# Patient Record
Sex: MALE | Race: WHITE | NOT HISPANIC OR LATINO | Employment: FULL TIME | URBAN - METROPOLITAN AREA
[De-identification: names, ages, dates, MRNs, and addresses within clinical notes are randomized per-mention and may not be internally consistent; named-entity substitution may affect disease eponyms.]

---

## 2017-02-28 ENCOUNTER — ALLSCRIPTS OFFICE VISIT (OUTPATIENT)
Dept: OTHER | Facility: OTHER | Age: 42
End: 2017-02-28

## 2017-02-28 DIAGNOSIS — R51.9 HEADACHE: ICD-10-CM

## 2017-02-28 DIAGNOSIS — Z00.00 ENCOUNTER FOR GENERAL ADULT MEDICAL EXAMINATION WITHOUT ABNORMAL FINDINGS: ICD-10-CM

## 2017-04-06 ENCOUNTER — APPOINTMENT (OUTPATIENT)
Dept: LAB | Facility: CLINIC | Age: 42
End: 2017-04-06
Payer: COMMERCIAL

## 2017-04-06 DIAGNOSIS — R51.9 HEADACHE: ICD-10-CM

## 2017-04-06 DIAGNOSIS — Z00.00 ENCOUNTER FOR GENERAL ADULT MEDICAL EXAMINATION WITHOUT ABNORMAL FINDINGS: ICD-10-CM

## 2017-04-06 LAB
ALBUMIN SERPL BCP-MCNC: 3.8 G/DL (ref 3.5–5)
ALP SERPL-CCNC: 97 U/L (ref 46–116)
ALT SERPL W P-5'-P-CCNC: 23 U/L (ref 12–78)
ANION GAP SERPL CALCULATED.3IONS-SCNC: 5 MMOL/L (ref 4–13)
AST SERPL W P-5'-P-CCNC: 10 U/L (ref 5–45)
BILIRUB SERPL-MCNC: 0.4 MG/DL (ref 0.2–1)
BUN SERPL-MCNC: 10 MG/DL (ref 5–25)
CALCIUM SERPL-MCNC: 9.2 MG/DL (ref 8.3–10.1)
CHLORIDE SERPL-SCNC: 105 MMOL/L (ref 100–108)
CHOLEST SERPL-MCNC: 248 MG/DL (ref 50–200)
CO2 SERPL-SCNC: 28 MMOL/L (ref 21–32)
CREAT SERPL-MCNC: 0.99 MG/DL (ref 0.6–1.3)
ERYTHROCYTE [DISTWIDTH] IN BLOOD BY AUTOMATED COUNT: 13.5 % (ref 11.6–15.1)
GFR SERPL CREATININE-BSD FRML MDRD: >60 ML/MIN/1.73SQ M
GLUCOSE P FAST SERPL-MCNC: 88 MG/DL (ref 65–99)
HCT VFR BLD AUTO: 44.5 % (ref 36.5–49.3)
HDLC SERPL-MCNC: 49 MG/DL (ref 40–60)
HGB BLD-MCNC: 15.1 G/DL (ref 12–17)
LDLC SERPL CALC-MCNC: 178 MG/DL (ref 0–100)
MCH RBC QN AUTO: 32.3 PG (ref 26.8–34.3)
MCHC RBC AUTO-ENTMCNC: 33.9 G/DL (ref 31.4–37.4)
MCV RBC AUTO: 95 FL (ref 82–98)
PLATELET # BLD AUTO: 331 THOUSANDS/UL (ref 149–390)
PMV BLD AUTO: 10.3 FL (ref 8.9–12.7)
POTASSIUM SERPL-SCNC: 4.4 MMOL/L (ref 3.5–5.3)
PROT SERPL-MCNC: 7.2 G/DL (ref 6.4–8.2)
RBC # BLD AUTO: 4.68 MILLION/UL (ref 3.88–5.62)
SODIUM SERPL-SCNC: 138 MMOL/L (ref 136–145)
TRIGL SERPL-MCNC: 107 MG/DL
WBC # BLD AUTO: 12.68 THOUSAND/UL (ref 4.31–10.16)

## 2017-04-06 PROCEDURE — 80061 LIPID PANEL: CPT

## 2017-04-06 PROCEDURE — 80053 COMPREHEN METABOLIC PANEL: CPT

## 2017-04-06 PROCEDURE — 36415 COLL VENOUS BLD VENIPUNCTURE: CPT

## 2017-04-06 PROCEDURE — 85027 COMPLETE CBC AUTOMATED: CPT

## 2017-05-04 ENCOUNTER — GENERIC CONVERSION - ENCOUNTER (OUTPATIENT)
Dept: OTHER | Facility: OTHER | Age: 42
End: 2017-05-04

## 2018-01-13 VITALS
SYSTOLIC BLOOD PRESSURE: 120 MMHG | RESPIRATION RATE: 18 BRPM | TEMPERATURE: 97.8 F | WEIGHT: 213 LBS | BODY MASS INDEX: 28.23 KG/M2 | DIASTOLIC BLOOD PRESSURE: 78 MMHG | HEART RATE: 88 BPM | HEIGHT: 73 IN

## 2018-05-12 DIAGNOSIS — E83.42 HYPOMAGNESEMIA: Primary | ICD-10-CM

## 2018-05-13 RX ORDER — MAGNESIUM OXIDE 400 MG/1
TABLET ORAL
Qty: 60 TABLET | Refills: 2 | Status: SHIPPED | OUTPATIENT
Start: 2018-05-13 | End: 2019-09-02 | Stop reason: HOSPADM

## 2018-08-23 DIAGNOSIS — E83.42 HYPOMAGNESEMIA: ICD-10-CM

## 2018-08-23 RX ORDER — MAGNESIUM OXIDE 400 MG/1
TABLET ORAL
Qty: 60 TABLET | Refills: 2 | OUTPATIENT
Start: 2018-08-23

## 2018-09-10 NOTE — TELEPHONE ENCOUNTER
Pt should have fu visit his pharmacy is requesting refill of magnesium and he has not been here isnce 2/2017

## 2019-09-01 ENCOUNTER — HOSPITAL ENCOUNTER (OUTPATIENT)
Facility: HOSPITAL | Age: 44
Setting detail: OBSERVATION
Discharge: HOME | End: 2019-09-02
Attending: EMERGENCY MEDICINE | Admitting: SURGERY
Payer: OTHER MISCELLANEOUS

## 2019-09-01 ENCOUNTER — APPOINTMENT (EMERGENCY)
Dept: RADIOLOGY | Facility: HOSPITAL | Age: 44
End: 2019-09-01
Payer: OTHER MISCELLANEOUS

## 2019-09-01 ENCOUNTER — APPOINTMENT (OUTPATIENT)
Dept: RADIOLOGY | Age: 44
End: 2019-09-01
Payer: OTHER MISCELLANEOUS

## 2019-09-01 ENCOUNTER — APPOINTMENT (OUTPATIENT)
Dept: URGENT CARE | Age: 44
End: 2019-09-01
Payer: OTHER MISCELLANEOUS

## 2019-09-01 DIAGNOSIS — S12.500A C6 CERVICAL FRACTURE (HCC): Primary | ICD-10-CM

## 2019-09-01 DIAGNOSIS — S12.500A: ICD-10-CM

## 2019-09-01 DIAGNOSIS — M54.2 NECK PAIN: Primary | ICD-10-CM

## 2019-09-01 DIAGNOSIS — M54.50 ACUTE BILATERAL LOW BACK PAIN WITHOUT SCIATICA: ICD-10-CM

## 2019-09-01 DIAGNOSIS — M54.2 NECK PAIN: ICD-10-CM

## 2019-09-01 LAB
ALBUMIN SERPL BCP-MCNC: 3.4 G/DL (ref 3.5–5)
ALP SERPL-CCNC: 90 U/L (ref 46–116)
ALT SERPL W P-5'-P-CCNC: 23 U/L (ref 12–78)
ANION GAP SERPL CALCULATED.3IONS-SCNC: 5 MMOL/L (ref 4–13)
AST SERPL W P-5'-P-CCNC: 21 U/L (ref 5–45)
BASOPHILS # BLD AUTO: 0.05 THOUSANDS/ΜL (ref 0–0.1)
BASOPHILS NFR BLD AUTO: 1 % (ref 0–1)
BILIRUB SERPL-MCNC: 0.18 MG/DL (ref 0.2–1)
BUN SERPL-MCNC: 11 MG/DL (ref 5–25)
CALCIUM SERPL-MCNC: 8.5 MG/DL (ref 8.3–10.1)
CHLORIDE SERPL-SCNC: 105 MMOL/L (ref 100–108)
CO2 SERPL-SCNC: 27 MMOL/L (ref 21–32)
CREAT SERPL-MCNC: 0.86 MG/DL (ref 0.6–1.3)
EOSINOPHIL # BLD AUTO: 0.18 THOUSAND/ΜL (ref 0–0.61)
EOSINOPHIL NFR BLD AUTO: 2 % (ref 0–6)
ERYTHROCYTE [DISTWIDTH] IN BLOOD BY AUTOMATED COUNT: 13.5 % (ref 11.6–15.1)
GFR SERPL CREATININE-BSD FRML MDRD: 107 ML/MIN/1.73SQ M
GLUCOSE SERPL-MCNC: 123 MG/DL (ref 65–140)
HCT VFR BLD AUTO: 40.6 % (ref 36.5–49.3)
HGB BLD-MCNC: 13.5 G/DL (ref 12–17)
IMM GRANULOCYTES # BLD AUTO: 0.02 THOUSAND/UL (ref 0–0.2)
IMM GRANULOCYTES NFR BLD AUTO: 0 % (ref 0–2)
LYMPHOCYTES # BLD AUTO: 3.96 THOUSANDS/ΜL (ref 0.6–4.47)
LYMPHOCYTES NFR BLD AUTO: 44 % (ref 14–44)
MCH RBC QN AUTO: 31.9 PG (ref 26.8–34.3)
MCHC RBC AUTO-ENTMCNC: 33.3 G/DL (ref 31.4–37.4)
MCV RBC AUTO: 96 FL (ref 82–98)
MONOCYTES # BLD AUTO: 0.63 THOUSAND/ΜL (ref 0.17–1.22)
MONOCYTES NFR BLD AUTO: 7 % (ref 4–12)
NEUTROPHILS # BLD AUTO: 4.18 THOUSANDS/ΜL (ref 1.85–7.62)
NEUTS SEG NFR BLD AUTO: 46 % (ref 43–75)
NRBC BLD AUTO-RTO: 0 /100 WBCS
PLATELET # BLD AUTO: 280 THOUSANDS/UL (ref 149–390)
PMV BLD AUTO: 9.7 FL (ref 8.9–12.7)
POTASSIUM SERPL-SCNC: 3.9 MMOL/L (ref 3.5–5.3)
PROT SERPL-MCNC: 6.7 G/DL (ref 6.4–8.2)
RBC # BLD AUTO: 4.23 MILLION/UL (ref 3.88–5.62)
SODIUM SERPL-SCNC: 137 MMOL/L (ref 136–145)
WBC # BLD AUTO: 9.02 THOUSAND/UL (ref 4.31–10.16)

## 2019-09-01 PROCEDURE — 72131 CT LUMBAR SPINE W/O DYE: CPT

## 2019-09-01 PROCEDURE — 99283 EMERGENCY DEPT VISIT LOW MDM: CPT | Performed by: PHYSICIAN ASSISTANT

## 2019-09-01 PROCEDURE — G0382 LEV 3 HOSP TYPE B ED VISIT: HCPCS | Performed by: PHYSICIAN ASSISTANT

## 2019-09-01 PROCEDURE — 72100 X-RAY EXAM L-S SPINE 2/3 VWS: CPT

## 2019-09-01 PROCEDURE — 85025 COMPLETE CBC W/AUTO DIFF WBC: CPT | Performed by: EMERGENCY MEDICINE

## 2019-09-01 PROCEDURE — 36415 COLL VENOUS BLD VENIPUNCTURE: CPT | Performed by: EMERGENCY MEDICINE

## 2019-09-01 PROCEDURE — 80053 COMPREHEN METABOLIC PANEL: CPT | Performed by: EMERGENCY MEDICINE

## 2019-09-01 PROCEDURE — 99285 EMERGENCY DEPT VISIT HI MDM: CPT

## 2019-09-01 PROCEDURE — 99285 EMERGENCY DEPT VISIT HI MDM: CPT | Performed by: EMERGENCY MEDICINE

## 2019-09-01 PROCEDURE — 72040 X-RAY EXAM NECK SPINE 2-3 VW: CPT

## 2019-09-01 PROCEDURE — 72125 CT NECK SPINE W/O DYE: CPT

## 2019-09-01 RX ORDER — ACETAMINOPHEN 325 MG/1
975 TABLET ORAL ONCE
Status: COMPLETED | OUTPATIENT
Start: 2019-09-01 | End: 2019-09-01

## 2019-09-01 RX ADMIN — ACETAMINOPHEN 975 MG: 325 TABLET ORAL at 21:57

## 2019-09-02 ENCOUNTER — APPOINTMENT (OUTPATIENT)
Dept: RADIOLOGY | Facility: HOSPITAL | Age: 44
End: 2019-09-02
Payer: OTHER MISCELLANEOUS

## 2019-09-02 VITALS
TEMPERATURE: 97.3 F | DIASTOLIC BLOOD PRESSURE: 73 MMHG | RESPIRATION RATE: 19 BRPM | BODY MASS INDEX: 28.44 KG/M2 | OXYGEN SATURATION: 96 % | SYSTOLIC BLOOD PRESSURE: 112 MMHG | WEIGHT: 210 LBS | HEART RATE: 84 BPM | HEIGHT: 72 IN

## 2019-09-02 PROBLEM — S12.500A TRAUMATIC CLOSED FRACTURE OF C6 VERTEBRA WITH MINIMAL DISPLACEMENT (HCC): Status: ACTIVE | Noted: 2019-09-02

## 2019-09-02 PROCEDURE — 99219 PR INITIAL OBSERVATION CARE/DAY 50 MINUTES: CPT | Performed by: EMERGENCY MEDICINE

## 2019-09-02 PROCEDURE — 99244 OFF/OP CNSLTJ NEW/EST MOD 40: CPT | Performed by: NEUROLOGICAL SURGERY

## 2019-09-02 PROCEDURE — NC001 PR NO CHARGE: Performed by: SURGERY

## 2019-09-02 PROCEDURE — 70496 CT ANGIOGRAPHY HEAD: CPT

## 2019-09-02 PROCEDURE — 72040 X-RAY EXAM NECK SPINE 2-3 VW: CPT

## 2019-09-02 PROCEDURE — 70498 CT ANGIOGRAPHY NECK: CPT

## 2019-09-02 RX ORDER — SODIUM CHLORIDE 9 MG/ML
75 INJECTION, SOLUTION INTRAVENOUS CONTINUOUS
Status: DISCONTINUED | OUTPATIENT
Start: 2019-09-02 | End: 2019-09-02

## 2019-09-02 RX ORDER — ACETAMINOPHEN 325 MG/1
975 TABLET ORAL EVERY 8 HOURS SCHEDULED
Qty: 30 TABLET | Refills: 0 | Status: SHIPPED | OUTPATIENT
Start: 2019-09-02

## 2019-09-02 RX ORDER — OXYCODONE HYDROCHLORIDE 10 MG/1
10 TABLET ORAL EVERY 4 HOURS PRN
Status: DISCONTINUED | OUTPATIENT
Start: 2019-09-02 | End: 2019-09-02 | Stop reason: HOSPADM

## 2019-09-02 RX ORDER — GABAPENTIN 100 MG/1
100 CAPSULE ORAL 3 TIMES DAILY
Status: DISCONTINUED | OUTPATIENT
Start: 2019-09-02 | End: 2019-09-02 | Stop reason: HOSPADM

## 2019-09-02 RX ORDER — OXYCODONE HYDROCHLORIDE 5 MG/1
5 TABLET ORAL EVERY 4 HOURS PRN
Qty: 20 TABLET | Refills: 0 | Status: SHIPPED | OUTPATIENT
Start: 2019-09-02 | End: 2019-09-12

## 2019-09-02 RX ORDER — METHOCARBAMOL 750 MG/1
750 TABLET, FILM COATED ORAL EVERY 6 HOURS SCHEDULED
Status: DISCONTINUED | OUTPATIENT
Start: 2019-09-02 | End: 2019-09-02 | Stop reason: HOSPADM

## 2019-09-02 RX ORDER — GABAPENTIN 100 MG/1
100 CAPSULE ORAL 3 TIMES DAILY
Qty: 90 CAPSULE | Refills: 0 | Status: SHIPPED | OUTPATIENT
Start: 2019-09-02 | End: 2019-09-30 | Stop reason: SDUPTHER

## 2019-09-02 RX ORDER — OXYCODONE HYDROCHLORIDE 5 MG/1
5 TABLET ORAL EVERY 4 HOURS PRN
Status: DISCONTINUED | OUTPATIENT
Start: 2019-09-02 | End: 2019-09-02 | Stop reason: HOSPADM

## 2019-09-02 RX ORDER — ACETAMINOPHEN 325 MG/1
975 TABLET ORAL EVERY 8 HOURS SCHEDULED
Status: DISCONTINUED | OUTPATIENT
Start: 2019-09-02 | End: 2019-09-02 | Stop reason: HOSPADM

## 2019-09-02 RX ORDER — ONDANSETRON 2 MG/ML
4 INJECTION INTRAMUSCULAR; INTRAVENOUS EVERY 4 HOURS PRN
Status: DISCONTINUED | OUTPATIENT
Start: 2019-09-02 | End: 2019-09-02 | Stop reason: HOSPADM

## 2019-09-02 RX ORDER — HYDROMORPHONE HCL/PF 1 MG/ML
0.5 SYRINGE (ML) INJECTION
Status: DISCONTINUED | OUTPATIENT
Start: 2019-09-02 | End: 2019-09-02 | Stop reason: HOSPADM

## 2019-09-02 RX ORDER — METHOCARBAMOL 750 MG/1
750 TABLET, FILM COATED ORAL EVERY 6 HOURS SCHEDULED
Qty: 60 TABLET | Refills: 0 | Status: SHIPPED | OUTPATIENT
Start: 2019-09-02 | End: 2019-10-14

## 2019-09-02 RX ADMIN — GABAPENTIN 100 MG: 100 CAPSULE ORAL at 12:06

## 2019-09-02 RX ADMIN — ACETAMINOPHEN 975 MG: 325 TABLET ORAL at 13:23

## 2019-09-02 RX ADMIN — SODIUM CHLORIDE 75 ML/HR: 0.9 INJECTION, SOLUTION INTRAVENOUS at 05:20

## 2019-09-02 RX ADMIN — METHOCARBAMOL 750 MG: 750 TABLET, FILM COATED ORAL at 12:06

## 2019-09-02 RX ADMIN — ACETAMINOPHEN 975 MG: 325 TABLET ORAL at 06:41

## 2019-09-02 RX ADMIN — IOHEXOL 85 ML: 350 INJECTION, SOLUTION INTRAVENOUS at 00:28

## 2019-09-02 NOTE — DISCHARGE INSTRUCTIONS
Discharge Instructions - Neurosurgery    · VISTA collar at all times except for showering change to arsenio collar  · No heavy lifting  No strenuous activities  NO DRIVING  **Please notify MD immediately if you have increased neck or arm pain  New numbness and/or weakness in your arm  Difficulty swallowing or breathing especially while lying down  Numbness or weakness in arms or legs  **    Please follow up in 2 weeks with the Neurosurgical group with repeat X-ray of cervical spine 2-3 days prior to your appointment  You may go to any [facility]'s facility to get your imaging done  Please call [phone] to schedule your imaging appointment

## 2019-09-02 NOTE — H&P
H&P Exam - Trauma   Yan Anderson 45 y o  male MRN: [id number]  Unit/Bed#: [facility] 613-01 Encounter: [id number]    Assessment/Plan   Trauma Alert: Evaluation  Model of Arrival: Self  Trauma Team: Attending Kody Marin and Residents Reza  Consultants: NSX    Trauma Active Problems:   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 and inferior facet with minimal comminution    Trauma Plan:   CTA neck  Neurosurgery consult  [place] collar  Cervical precautions  Analgesia  Neuro checks    Chief Complaint: My neck hurts    History of Present Illness   HPI:  Yan Anderson is a 45 y o  male who presents as a trauma consultation for a C6 fracture  The patient dropped a Pal[place] of foam pads off a laron while working 5 days ago  He initially lost his balance and fell onto his low back  The palate hit him on top of his head  He experience a crunching sensation and severe pain in his neck  He went to an urgent care and had plain films that were concerning for facet fracture  He was sent here for CT scan  He complains of neck pain  Denies headache  No focal neuro deficits  Mechanism:Fall    Review of Systems   Constitutional: Negative for appetite change, chills, diaphoresis, fatigue and fever  HENT: Negative for congestion, rhinorrhea and sore throat  Respiratory: Negative for apnea, cough, choking, chest tightness, shortness of breath, wheezing and stridor  Cardiovascular: Negative for chest pain, palpitations and leg swelling  Gastrointestinal: Negative for abdominal distention, abdominal pain, constipation, diarrhea, nausea and vomiting  Genitourinary: Negative for dysuria and hematuria  Musculoskeletal: Positive for neck pain  Negative for back pain and neck stiffness  Skin: Negative for pallor, rash and wound  Neurological: Negative for dizziness, light-headedness and headaches  Psychiatric/Behavioral: Negative for behavioral problems and confusion         Historical Information       History reviewed  No pertinent past medical history  Past Surgical History:   Procedure Laterality Date    CHOLECYSTECTOMY      GALLBLADDER SURGERY N/A 2010     Social History   Social History     Substance and Sexual Activity   Alcohol Use Not Currently     Social History     Substance and Sexual Activity   Drug Use Not Currently     Social History     Tobacco Use   Smoking Status Never Smoker   Smokeless Tobacco Never Used       There is no immunization history on file for this patient  Last Tetanus: unknown  Family History: Non-contributory        Meds/Allergies   all current active meds have been reviewed    No Known Allergies      PHYSICAL EXAM    PE limited by: n/a    Objective   Vitals:   First set: Temperature: 98 °F (36 7 °C) (09/01/19 1957)  Pulse: 91 (09/01/19 1957)  Respirations: 18 (09/01/19 1957)  Blood Pressure: 149/83 (09/01/19 1957)    Primary Survey:   (A) Airway: intact  (B) Breathing: ctab  (C) Circulation: Pulses:   carotid  2/4, pedal  2/4, radial  2/4 and femoral  2/4  (D) Disabliity:  GCS Total:  15  (E) Expose:  Completed    Secondary Survey: (Click on Physical Exam tab above)  Physical Exam   Constitutional: He is oriented to person, place, and time  He appears well-developed and well-nourished  No distress  HENT:   Head: Normocephalic and atraumatic  Eyes: Pupils are equal, round, and reactive to light  Conjunctivae and EOM are normal  No scleral icterus  Neck: Normal range of motion  Neck supple  Cardiovascular: Normal rate, regular rhythm, normal heart sounds and intact distal pulses  No murmur heard  Pulmonary/Chest: Effort normal and breath sounds normal  No respiratory distress  He has no wheezes  Abdominal: Soft  Bowel sounds are normal  He exhibits no distension  There is no tenderness  Musculoskeletal: Normal range of motion  He exhibits tenderness (Lower cervical midline tenderness to palpation  No deformity, step-off )  He exhibits no edema     Neurological: He is alert and oriented to person, place, and time  He displays normal reflexes  No cranial nerve deficit or sensory deficit  He exhibits normal muscle tone  Coordination normal  GCS eye subscore is 4  GCS verbal subscore is 5  GCS motor subscore is 6  Skin: Skin is warm and dry  No rash noted  He is not diaphoretic  Psychiatric: He has a normal mood and affect  His behavior is normal    Nursing note and vitals reviewed  Invasive Devices     Peripheral Intravenous Line            Peripheral IV 09/01/19 Left Antecubital less than 1 day                Lab Results:   Results: I have personally reviewed pertinent films in PACS, BMP/CMP:   Lab Results   Component Value Date    SODIUM 137 09/01/2019    K 3 9 09/01/2019     09/01/2019    CO2 27 09/01/2019    BUN 11 09/01/2019    CREATININE 0 86 09/01/2019    CALCIUM 8 5 09/01/2019    AST 21 09/01/2019    ALT 23 09/01/2019    ALKPHOS 90 09/01/2019    EGFR 107 09/01/2019    and CBC:   Lab Results   Component Value Date    WBC 9 02 09/01/2019    HGB 13 5 09/01/2019    HCT 40 6 09/01/2019    MCV 96 09/01/2019     09/01/2019    MCH 31 9 09/01/2019    MCHC 33 3 09/01/2019    RDW 13 5 09/01/2019    MPV 9 7 09/01/2019    NRBC 0 09/01/2019     Imaging/EKG Studies: Results: I have personally reviewed pertinent films in PACS   Cta Head And Neck With And Without Contrast    Result Date: 9/2/2019  Impression: 1  No acute intracranial hemorrhage, midline shift, or mass effect  2   No acute vascular injury identified within the arteries of the neck  3   No high-grade proximal stenosis of the visualized [place]  4   No significant intracranial arteriovenous malformation or aneurysm  5   Redemonstrated displaced fracture of the C6 facet on the left with stable grade 1 anterolisthesis of C6 on C7  Further evaluation with MRI may be obtained if there is concern for ligamentous injury   Workstation performed: [id number]     Xr Spine Cervical 2 Or 3 Vw Injury    Result Date: 9/1/2019  Impression: Radiographic findings which are quite suspicious for traumatic unilateral jumped facet at C6-C7 level  Further characterization with cervical spine CT is necessary  I personally discussed this study with Fox Pineda on 9/1/2019 at 7:17 PM  Workstation performed: [id number]     Xr Spine Lumbar 2 Or 3 Views Injury    Result Date: 9/1/2019  Impression: No acute osseous abnormality  Degenerative changes as described  Workstation performed: [id number]     Trauma - Ct Spine Cervical Wo Contrast    Result Date: 9/1/2019  Impression: 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 and inferior facet with minimal comminution  There is associated rotatory subluxation with anterolisthesis of C6 relative to C7 as a result of the left posterior elements fracture at C6  The fracture line approaches the left C6 transverse foramen and therefore CT angiography is recommended to exclude associated vascular injury  I personally discussed this study with Edwige Bautista on 9/1/2019 at 10:04 PM   Workstation performed: [id number]     Ct Spine Lumbar Wo Contrast    Result Date: 9/1/2019  Impression: No acute fracture or traumatic malalignment in the lumbar spine  Mild lumbar degenerative changes   Workstation performed: [id number]     Other Studies:     Code Status: Level 1 - Full Code  Advance Directive and Living Will:      Power of :    POLST:

## 2019-09-02 NOTE — PLAN OF CARE
Problem: PAIN - ADULT  Goal: Verbalizes/displays adequate comfort level or baseline comfort level  Description  Interventions:  - Encourage patient to monitor pain and request assistance  - Assess pain using appropriate pain scale  - Administer analgesics based on type and severity of pain and evaluate response  - Implement non-pharmacological measures as appropriate and evaluate response  - Consider cultural and social influences on pain and pain management  - Notify physician/advanced practitioner if interventions unsuccessful or patient reports new pain  Outcome: Progressing     Problem: INFECTION - ADULT  Goal: Absence or prevention of progression during hospitalization  Description  INTERVENTIONS:  - Assess and monitor for signs and symptoms of infection  - Monitor lab/diagnostic results  - Monitor all insertion sites, i e  indwelling lines, tubes, and drains  - Monitor endotracheal if appropriate and nasal secretions for changes in amount and color  - [place] appropriate cooling/warming therapies per order  - Administer medications as ordered  - Instruct and encourage patient and family to use good hand hygiene technique  - Identify and instruct in appropriate isolation precautions for identified infection/condition  Outcome: Progressing     Problem: SAFETY ADULT  Goal: Patient will remain free of falls  Description  INTERVENTIONS:  - Assess patient frequently for physical needs  -  Identify cognitive and physical deficits and behaviors that affect risk of falls    -  [place] fall precautions as indicated by assessment   - Educate patient/family on patient safety including physical limitations  - Instruct patient to call for assistance with activity based on assessment  - Modify environment to reduce risk of injury  - Consider OT/PT consult to assist with strengthening/mobility  Outcome: Progressing  Goal: Maintain or return to baseline ADL function  Description  INTERVENTIONS:  -  Assess patient's ability to carry out ADLs; assess patient's baseline for ADL function and identify physical deficits which impact ability to perform ADLs (bathing, care of mouth/teeth, toileting, grooming, dressing, etc )  - Assess/evaluate cause of self-care deficits   - Assess range of motion  - Assess patient's mobility; develop plan if impaired  - Assess patient's need for assistive devices and provide as appropriate  - Encourage maximum independence but intervene and supervise when necessary  - Involve family in performance of ADLs  - Assess for home care needs following discharge   - Consider OT consult to assist with ADL evaluation and planning for discharge  - Provide patient education as appropriate  Outcome: Progressing  Goal: Maintain or return mobility status to optimal level  Description  INTERVENTIONS:  - Assess patient's baseline mobility status (ambulation, transfers, stairs, etc )    - Identify cognitive and physical deficits and behaviors that affect mobility  - Identify mobility aids required to assist with transfers and/or ambulation (gait belt, sit-to-stand, lift, walker, cane, etc )  - [place] fall precautions as indicated by assessment  - Record patient progress and toleration of activity level on Mobility SBAR; progress patient to next Phase/Stage  - Instruct patient to call for assistance with activity based on assessment  - Consider rehabilitation consult to assist with strengthening/weightbearing, etc   Outcome: Progressing     Problem: DISCHARGE PLANNING  Goal: Discharge to home or other facility with appropriate resources  Description  INTERVENTIONS:  - Identify barriers to discharge w/patient and caregiver  - Arrange for needed discharge resources and transportation as appropriate  - Identify discharge learning needs (meds, wound care, etc )  - Arrange for interpretive services to assist at discharge as needed  - Refer to Case Management Department for coordinating discharge planning if the patient needs post-hospital services based on physician/advanced practitioner order or complex needs related to functional status, cognitive ability, or social support system  Outcome: Progressing

## 2019-09-02 NOTE — CONSULTS
Addendum:    X-ray cervical spine completed today appears grossly stable with adequate alignment when compared to previous x-ray done yesterday  At this time will trial conservative management  [employer] collar at all times, Lacy collar for showering  Patient will return to the office in 2 weeks with repeat upright cervical spine x-rays for further evaluation of C6 left facet fracture  He is instructed to call the office sooner should he experience worsening symptoms or red flag signs  Informed patient that at the 2 week follow-up will evaluated fracture is getting better or if surgical intervention may be warranted at that time  Patient and wife demonstrate understanding and are in agreement with the plan  Patient is okay to have a diet and ambulate with physical therapy and occupational therapy  He is cleared from a neurosurgical standpoint to discharge  Will see as needed during hospitalization  Signed off  Consult- Mallorie Hood 1975, 45 y o  male MRN: [id number]    Unit/Bed#: [facility] 128-83 Encounter: [id number]    Primary Care Provider: Lien Reid   Date and time admitted to hospital: 9/1/2019  7:54 PM      Inpatient consult to Neurosurgery  Consult performed by: Karen Craig PA-C  Consult ordered by: Jason Lopez MD      consult completed on 9/2/2019 at 0840    Traumatic closed fracture of C6 vertebra with minimal displacement [facility])  Assessment & Plan  SLICS 5    Patient presents status post work-related injury where large Pallet fell on head x2  At this time he exhibits a nonfocal examination with good strength and intact sensation, no tenderness to palpation in cervical spine  Imaging reviewed personally with attending, results are as follows:  · 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 and inferior facet with minimal comminution    There is associated rotary subluxation with anterior listhesis of C6 relative to C7 as a result of the left posterior elements fracture at C6  The fracture line approaches the C6 transverse foramen  Plan:  · [place] collar to be worn at all times, Lacy collar for showering  · Upright cervical spine x-rays ordered and pending to assess spinal alignment and if further listhesis occurs  · Medical management and pain control per primary team  · DVT prophylaxis:  SCDs, would hold pharmacological DVT prophylaxis at this time  · Would keep patient NPO for now  · Mobilize as tolerated with assistance, must wear brace, would wait for upright x-rays to be completed  · Neurosurgery will follow during hospitalization  Given SLICs score of 5 and nature of fracture, surgical intervention may be warranted for cervical spine stabilization  Will reassess once upright imaging has been completed  Please call with question or concerns  History of Present Illness   HPI: Jasmina Parekh is a 45y o  year old male without significant past medical history who presents with complaint of neck pain status post palate falling on head x2 with CT cervical spine significant for left C6 facet fracture  Patient recently started a new job as a   On Tuesday, will moving pallets on a [employer], the top pallet fell off and fell on his head  He fell to the ground and the pallet fell further, hitting him in the head once again  He denies loss of consciousness but began to experience neck pain  He continued to work throughout the week, attempted to take Tylenol for pain relief however this did not help much  Pain was worse with movement and lifting things  He states in the past he had a herniated disc which caused a numbing sensation in lower back and into hips which he feels slightly flared up when pallet fell on head however states this is starting to resolve  He complains of a headache which is resolving at this time  Wife is present and states he has been walking hunched over secondary to pain in neck and back    He otherwise denies numbness/tingling/weakness, bowel or bladder issues, saddle anesthesia, change in vision or hearing, gait issues, fine motor skill difficulty  Patient ambulates without assistive devices  No blood thinner use  Review of Systems   Constitutional: Positive for activity change  Negative for chills and fever  HENT: Negative for hearing loss and trouble swallowing  Eyes: Negative for visual disturbance  Respiratory: Negative for chest tightness and shortness of breath  Cardiovascular: Negative for chest pain  Gastrointestinal: Negative for abdominal pain, constipation, diarrhea, nausea and vomiting  Genitourinary: Negative for difficulty urinating  Musculoskeletal: Positive for back pain and neck pain  Skin: Negative for wound  Allergic/Immunologic: Negative for environmental allergies and food allergies  Neurological: Positive for numbness and headaches  Negative for dizziness, facial asymmetry, speech difficulty and weakness  Hematological: Does not bruise/bleed easily  Psychiatric/Behavioral: Negative for confusion  Historical Information   History reviewed  No pertinent past medical history  Past Surgical History:   Procedure Laterality Date    CHOLECYSTECTOMY      GALLBLADDER SURGERY N/A 2010     Social History     Substance and Sexual Activity   Alcohol Use Not Currently     Social History     Substance and Sexual Activity   Drug Use Not Currently     Social History     Tobacco Use   Smoking Status Never Smoker   Smokeless Tobacco Never Used     History reviewed  No pertinent family history      Meds/Allergies   all current active meds have been reviewed, current meds:   Current Facility-Administered Medications   Medication Dose Route Frequency    acetaminophen (TYLENOL) tablet 975 mg  975 mg Oral Q8H [facility]    HYDROmorphone (DILAUDID) injection 0 5 mg  0 5 mg Intravenous Q1H PRN    ondansetron (ZOFRAN) injection 4 mg  4 mg Intravenous Q4H PRN    oxyCODONE (ROXICODONE) immediate release tablet 10 mg  10 mg Oral Q4H PRN    oxyCODONE (ROXICODONE) IR tablet 5 mg  5 mg Oral Q4H PRN    sodium chloride 0 9 % infusion  75 mL/hr Intravenous Continuous    and PTA meds:   Prior to Admission Medications   Prescriptions Last Dose Informant Patient Reported? Taking?   magnesium oxide (MAG-OX) 400 mg tablet   No No   Sig: TAKE 2 TABLET DAILY      Facility-Administered Medications: None     No Known Allergies    Objective   I/O       08/31 0701 - 09/01 0700 09/01 0701 - 09/02 0700 09/02 0701 - 09/03 0700           Unmeasured Urine Occurrence  2 x           Physical Exam   Constitutional: He is oriented to person, place, and time  He appears well-developed and well-nourished  He is cooperative  Cervical collar in place  HENT:   Head: Normocephalic and atraumatic  Eyes: Pupils are equal, round, and reactive to light  Conjunctivae and EOM are normal    Neck: No spinous process tenderness and no muscular tenderness present  Cardiovascular: Normal rate  Pulmonary/Chest: Effort normal  No respiratory distress  Musculoskeletal: Normal range of motion  Cervical back: He exhibits no tenderness  Thoracic back: He exhibits no tenderness  Lumbar back: He exhibits no tenderness  Neurological: He is alert and oriented to person, place, and time  He has normal strength  He has a normal Finger-Nose-Finger Test    Reflex Scores:       Bicep reflexes are 2+ on the right side and 2+ on the left side  Brachioradialis reflexes are 2+ on the right side and 2+ on the left side  Patellar reflexes are 2+ on the right side and 2+ on the left side  Achilles reflexes are 2+ on the right side and 2+ on the left side  Skin: Skin is warm, dry and intact  Psychiatric: He has a normal mood and affect   His speech is normal and behavior is normal  Judgment and thought content normal  Cognition and memory are normal      Neurologic Exam     Mental Status   Oriented to person, place, and time  Follows 1 step commands  Attention: normal  Concentration: normal    Speech: speech is normal   Level of consciousness: alert  Knowledge: good  Able to perform simple calculations  Able to name object  Able to repeat  Normal comprehension  Cranial Nerves     CN II   Right visual field deficit: none  Left visual field deficit: none     CN III, IV, VI   Pupils are equal, round, and reactive to light  Extraocular motions are normal    CN III: no CN III palsy  CN VI: no CN VI palsy  Nystagmus: none   Diplopia: none  Ophthalmoparesis: none  Upgaze: normal  Downgaze: normal  Conjugate gaze: present    CN V   Right facial sensation deficit: none  Left facial sensation deficit: none    CN VII   Right facial weakness: none  Left facial weakness: none    CN VIII   Hearing: intact    CN IX, X   CN IX normal    CN X normal      CN XI   Right trapezius strength: normal  Left trapezius strength: normal    CN XII   CN XII normal      Motor Exam   Muscle bulk: normal  Overall muscle tone: normal  Right arm pronator drift: absent  Left arm pronator drift: absent    Strength   Strength 5/5 throughout  Sensory Exam   Light touch normal    Proprioception normal    Pinprick normal    DST intact     Gait, Coordination, and Reflexes     Coordination   Finger to nose coordination: normal    Tremor   Resting tremor: absent  Intention tremor: absent  Action tremor: absent    Reflexes   Right brachioradialis: 2+  Left brachioradialis: 2+  Right biceps: 2+  Left biceps: 2+  Right patellar: 2+  Left patellar: 2+  Right achilles: 2+  Left achilles: 2+  Right : 2+  Left : 2+  Right Laguerre: absent  Left Laguerre: absent  Right ankle clonus: absent  Left ankle clonus: absent      Vitals:Blood pressure 112/73, pulse 84, temperature (!) 97 3 °F (36 3 °C), resp  rate 17, height 6' (1 829 m), weight 95 3 kg (210 lb), SpO2 96 %  ,Body mass index is 28 48 kg/m²       Lab Results:   Results from last 7 days   Lab Units 09/01/19  2229   WBC Thousand/uL 9 02   HEMOGLOBIN g/dL 13 5   HEMATOCRIT % 40 6   PLATELETS Thousands/uL 280   NEUTROS PCT % 46   MONOS PCT % 7     Results from last 7 days   Lab Units 09/01/19  2229   POTASSIUM mmol/L 3 9   CHLORIDE mmol/L 105   CO2 mmol/L 27   BUN mg/dL 11   CREATININE mg/dL 0 86   CALCIUM mg/dL 8 5   ALK PHOS U/L 90   ALT U/L 23   AST U/L 21       Imaging Studies: I have personally reviewed pertinent reports  and I have personally reviewed pertinent films in PACS    Cta Head And Neck With And Without Contrast    Result Date: 9/2/2019  Impression: 1  No acute intracranial hemorrhage, midline shift, or mass effect  2   No acute vascular injury identified within the arteries of the neck  3   No high-grade proximal stenosis of the visualized [place]  4   No significant intracranial arteriovenous malformation or aneurysm  5   Redemonstrated displaced fracture of the C6 facet on the left with stable grade 1 anterolisthesis of C6 on C7  Further evaluation with MRI may be obtained if there is concern for ligamentous injury  Workstation performed: [id number]     Xr Spine Cervical 2 Or 3 Vw Injury    Result Date: 9/1/2019  Impression: Radiographic findings which are quite suspicious for traumatic unilateral jumped facet at C6-C7 level  Further characterization with cervical spine CT is necessary  I personally discussed this study with Candance Deed on 9/1/2019 at 7:17 PM  Workstation performed: [id number]     Xr Spine Lumbar 2 Or 3 Views Injury    Result Date: 9/1/2019  Impression: No acute osseous abnormality  Degenerative changes as described  Workstation performed: [id number]     Trauma - Ct Spine Cervical Wo Contrast    Result Date: 9/1/2019  Impression: 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 and inferior facet with minimal comminution    There is associated rotatory subluxation with anterolisthesis of C6 relative to C7 as a result of the left posterior elements fracture at C6  The fracture line approaches the left C6 transverse foramen and therefore CT angiography is recommended to exclude associated vascular injury  I personally discussed this study with Alexa Payne on 9/1/2019 at 10:04 PM   Workstation performed: [id number]     Ct Spine Lumbar Wo Contrast    Result Date: 9/1/2019  Impression: No acute fracture or traumatic malalignment in the lumbar spine  Mild lumbar degenerative changes  Workstation performed: [id number]     EKG, Pathology, and Other Studies: I have personally reviewed pertinent reports  VTE Prophylaxis: Sequential compression device Janneth Bains     Code Status: Level 1 - Full Code  Advance Directive and Living Will:      Power of :    POLST:      Counseling / Coordination of Care  I spent 20 minutes with the patient

## 2019-09-02 NOTE — ED PROVIDER NOTES
History  Chief Complaint   Patient presents with    Neck Injury     pt brought in by ems from [place] [id number] pt was injuryed on the job site and is c/o pain in his neck     46 yo with no medical history  Comes in after accident when carrying and moving some foam pads with a laron   They hit a pipe and fell back on him causing him to fall onto his tailbone and his back onto concrete  This happened approx 5 days prior to presentation  He had continued neck and lumbar spine pain so he went to [facility] today  He had xray cervical and found to have facet fracture  Placed in C collar and transferred here    He has pain over C7-T1  He is in cervical collar on arrival  He has no neurologic deficits throughout  He denies incontinence or saddle anaesthesia  Over the last few days he has had no difficulty ambulating          Prior to Admission Medications   Prescriptions Last Dose Informant Patient Reported? Taking?   magnesium oxide (MAG-OX) 400 mg tablet   No No   Sig: TAKE 2 TABLET DAILY      Facility-Administered Medications: None       History reviewed  No pertinent past medical history  Past Surgical History:   Procedure Laterality Date    CHOLECYSTECTOMY      GALLBLADDER SURGERY N/A 2010       History reviewed  No pertinent family history  I have reviewed and agree with the history as documented  Social History     Tobacco Use    Smoking status: Never Smoker    Smokeless tobacco: Never Used   Substance Use Topics    Alcohol use: Not Currently    Drug use: Not Currently        Review of Systems   Constitutional: Negative for activity change, chills, diaphoresis, fatigue and fever  HENT: Negative for congestion, postnasal drip, rhinorrhea, sneezing, sore throat and trouble swallowing  Eyes: Negative for visual disturbance  Respiratory: Negative for cough, chest tightness, shortness of breath and wheezing  Cardiovascular: Negative for chest pain and leg swelling     Gastrointestinal: Negative for abdominal distention, abdominal pain, blood in stool, constipation, diarrhea, nausea and vomiting  Genitourinary: Negative for decreased urine volume, dysuria, flank pain, frequency, hematuria and urgency  Musculoskeletal: Positive for back pain and neck pain  Skin: Negative for color change and rash  Neurological: Negative for syncope, weakness, light-headedness and headaches  Psychiatric/Behavioral: Negative for confusion and sleep disturbance  All other systems reviewed and are negative  Physical Exam  ED Triage Vitals [09/01/19 1957]   Temperature Pulse Respirations Blood Pressure SpO2   98 °F (36 7 °C) 91 18 149/83 98 %      Temp Source Heart Rate Source Patient Position - Orthostatic VS BP Location FiO2 (%)   Oral Monitor -- -- --      Pain Score       7             Orthostatic Vital Signs  Vitals:    09/01/19 1957 09/01/19 2210 09/02/19 0039 09/02/19 0711   BP: 149/83 144/70 116/76 112/73   Pulse: 91 85 85 84       Physical Exam   Constitutional: He is oriented to person, place, and time  He appears well-developed and well-nourished  No distress  HENT:   Head: Normocephalic and atraumatic  Nose: Nose normal    Eyes: Pupils are equal, round, and reactive to light  Conjunctivae and EOM are normal  No scleral icterus  Neck: Normal range of motion  Neck supple  No tracheal deviation present  Cardiovascular: Normal rate, regular rhythm, normal heart sounds and intact distal pulses  No murmur heard  Pulmonary/Chest: Effort normal and breath sounds normal  No stridor  No respiratory distress  He has no wheezes  Abdominal: Soft  Bowel sounds are normal  He exhibits no distension  There is no tenderness  There is no guarding  Musculoskeletal: Normal range of motion  He exhibits tenderness (over C7 midline  No stepoffs)  He exhibits no edema or deformity  Pain throughout sacrum, greater paraspinal than midline   Neurological: He is alert and oriented to person, place, and time   No cranial nerve deficit or sensory deficit  He exhibits normal muscle tone  Skin: Skin is warm and dry  Capillary refill takes less than 2 seconds  He is not diaphoretic  Psychiatric: He has a normal mood and affect  His behavior is normal  Judgment and thought content normal    Nursing note and vitals reviewed        ED Medications  Medications   acetaminophen (TYLENOL) tablet 975 mg (975 mg Oral Given 9/1/19 2157)   iohexol (OMNIPAQUE) 350 MG/ML injection (MULTI-DOSE) 85 mL (85 mL Intravenous Given 9/2/19 0028)       Diagnostic Studies  Results Reviewed     Procedure Component Value Units Date/Time    Comprehensive metabolic panel [[id number]]  (Abnormal) Collected:  09/01/19 2229    Lab Status:  Final result Specimen:  Blood from Arm, Left Updated:  09/01/19 2304     Sodium 137 mmol/L      Potassium 3 9 mmol/L      Chloride 105 mmol/L      CO2 27 mmol/L      ANION GAP 5 mmol/L      BUN 11 mg/dL      Creatinine 0 86 mg/dL      Glucose 123 mg/dL      Calcium 8 5 mg/dL      AST 21 U/L      ALT 23 U/L      Alkaline Phosphatase 90 U/L      Total Protein 6 7 g/dL      Albumin 3 4 g/dL      Total Bilirubin 0 18 mg/dL      eGFR 107 ml/min/1 73sq m     Narrative:       [place] guidelines for Chronic Kidney Disease (CKD):     Stage 1 with normal or high GFR (GFR > 90 mL/min/1 73 square meters)    Stage 2 Mild CKD (GFR = 60-89 mL/min/1 73 square meters)    Stage 3A Moderate CKD (GFR = 45-59 mL/min/1 73 square meters)    Stage 3B Moderate CKD (GFR = 30-44 mL/min/1 73 square meters)    Stage 4 Severe CKD (GFR = 15-29 mL/min/1 73 square meters)    Stage 5 End Stage CKD (GFR <15 mL/min/1 73 square meters)  Note: GFR calculation is accurate only with a steady state creatinine    CBC and differential [[id number]] Collected:  09/01/19 2229    Lab Status:  Final result Specimen:  Blood from Arm, Left Updated:  09/01/19 2241     WBC 9 02 Thousand/uL      RBC 4 23 Million/uL      Hemoglobin 13 5 g/dL Hematocrit 40 6 %      MCV 96 fL      MCH 31 9 pg      MCHC 33 3 g/dL      RDW 13 5 %      MPV 9 7 fL      Platelets 795 Thousands/uL      nRBC 0 /100 WBCs      Neutrophils Relative 46 %      Immat GRANS % 0 %      Lymphocytes Relative 44 %      Monocytes Relative 7 %      Eosinophils Relative 2 %      Basophils Relative 1 %      Neutrophils Absolute 4 18 Thousands/µL      Immature Grans Absolute 0 02 Thousand/uL      Lymphocytes Absolute 3 96 Thousands/µL      Monocytes Absolute 0 63 Thousand/µL      Eosinophils Absolute 0 18 Thousand/µL      Basophils Absolute 0 05 Thousands/µL                  XR spine cervical 2 or 3 vw injury   Final Result by Rajeev Rosas MD (09/02 1529)      Stable anterolisthesis of C6 on C7 related to known left C6 facet fracture  Workstation performed: [id number]         CTA head and neck with and without contrast   Final Result by Travis Maxwell MD (09/02 0128)      1  No acute intracranial hemorrhage, midline shift, or mass effect  2   No acute vascular injury identified within the arteries of the neck  3   No high-grade proximal stenosis of the visualized [place] of [place]  4   No significant intracranial arteriovenous malformation or aneurysm  5   Redemonstrated displaced fracture of the C6 facet on the left with stable grade 1 anterolisthesis of C6 on C7  Further evaluation with MRI may be obtained if there is concern for ligamentous injury  Workstation performed: [id number]         TRAUMA - CT spine cervical wo contrast   Final Result by Jamel Kat MD (09/01 2206)      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 and inferior facet with minimal comminution  There is associated rotatory subluxation with anterolisthesis of C6 relative to C7 as a result of the left posterior elements fracture at C6  The fracture line approaches the left C6 transverse foramen and therefore CT angiography is recommended to exclude associated vascular injury  I personally discussed this study with Aida Lynn on 9/1/2019 at 10:04 PM                       Workstation performed: [id number]         CT spine lumbar wo contrast   Final Result by Kennedy Canas MD (09/01 2208)      No acute fracture or traumatic malalignment in the lumbar spine  Mild lumbar degenerative changes  Workstation performed: [id number]         XR spine cervical 2 or 3 vw injury    (Results Pending)         Procedures  Procedures        ED Course                               MDM  Number of Diagnoses or Management Options  C6 cervical fracture [facility]):   Diagnosis management comments: Trauma consulted    XR spine cervical 2 or 3 vw injury   Final Result by Radha Sims MD (09/02 1529)        Stable anterolisthesis of C6 on C7 related to known left C6 facet fracture  Workstation performed: [id number]         CTA head and neck with and without contrast   Final Result by Tasha Gaston MD (09/02 0128)        1  No acute intracranial hemorrhage, midline shift, or mass effect  2   No acute vascular injury identified within the arteries of the neck  3   No high-grade proximal stenosis of the visualized [place] of [place]  4   No significant intracranial arteriovenous malformation or aneurysm  5   Redemonstrated displaced fracture of the C6 facet on the left with stable grade 1 anterolisthesis of C6 on C7  Further evaluation with MRI may be obtained if there is concern for ligamentous injury  Workstation performed: [id number]         TRAUMA - CT spine cervical wo contrast   Final Result by Kennedy Canas MD (09/01 2206)        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 and inferior facet with minimal comminution  There is associated rotatory subluxation with anterolisthesis of C6 relative to C7 as a result of the left posterior elements fracture at C6          The fracture line approaches the left C6 transverse foramen and therefore CT angiography is recommended to exclude associated vascular injury  I personally discussed this study with Drusilla Claude on 9/1/2019 at 10:04 PM                              Workstation performed: [id number]         CT spine lumbar wo contrast   Final Result by Dain Askew MD (09/01 2208)        No acute fracture or traumatic malalignment in the lumbar spine  Mild lumbar degenerative changes  Workstation performed: [id number]         XR spine cervical 2 or 3 vw injury    (Results Pending)        Disposition  Final diagnoses:   C6 cervical fracture ([address] )     Time reflects when diagnosis was documented in both MDM as applicable and the Disposition within this note     Time User Action Codes Description Comment    9/1/2019 11:29 PM Alexus Can Add [S12 500A] C6 cervical fracture ([address] )     9/2/2019 12:02 PM John Cho Add [S12 500A] Closed traumatic minimally displaced fracture of sixth cervical vertebra, initial encounter [facility])       ED Disposition     None      Follow-up Information     Follow up With Specialties Details Why Contact Info Additional Information    [facility] Follow up someone from the office will call to set up an appointment in 2 weeks with repeat upright x-rays of cervical spine, to be completed 2-3 days prior to appointment  you may call [phone] to schedule appointment   [facility], [address] [place] [address], [phone]          Discharge Medication List as of 9/3/2019  3:38 PM      START taking these medications    Details   acetaminophen (TYLENOL) 325 mg tablet Take 3 tablets (975 mg total) by mouth every 8 (eight) hours, Starting Mon 9/2/2019, Normal      gabapentin (NEURONTIN) 100 mg capsule Take 1 capsule (100 mg total) by mouth 3 (three) times a day, Starting Mon 9/2/2019, Normal methocarbamol (ROBAXIN) 750 mg tablet Take 1 tablet (750 mg total) by mouth every 6 (six) hours, Starting Mon 9/2/2019, Normal      oxyCODONE (ROXICODONE) 5 mg immediate release tablet Take 1 tablet (5 mg total) by mouth every 4 (four) hours as needed for moderate pain for up to 10 daysMax Daily Amount: 30 mg, Starting Mon 9/2/2019, Until Thu 9/12/2019, Normal         STOP taking these medications       magnesium oxide (MAG-OX) 400 mg tablet Comments:   Reason for Stopping:             Outpatient Discharge Orders   XR spine cervical 2 or 3 vw injury   Standing Status: Future Standing Exp  Date: 09/02/23     Discharge Diet     Activity as tolerated     Call provider for:  persistent nausea or vomiting     Call provider for:  severe uncontrolled pain     Call provider for:  difficulty breathing, headache or visual disturbances     Call provider for:  persistent dizziness or light-headedness     Call provider for:  extreme fatigue       ED Provider  Attending physically available and evaluated Jemal Purdy I managed the patient along with the ED Attending      Electronically Signed by         Stanley Lennox, MD  09/03/19 9142

## 2019-09-02 NOTE — UTILIZATION REVIEW
Initial Clinical Review    Admission: Date/Time/Statement: 9/1/19 @ 2330 OBSERVATION  Orders Placed This Encounter   Procedures    Place in Observation     Standing Status:   Standing     Number of Occurrences:   1     Order Specific Question:   Admitting Physician     Answer:   Kami Rankin     Order Specific Question:   Level of Care     Answer:   Med Surg [16]     ED Arrival Information     Expected Arrival Acuity Means of Arrival Escorted By Service Admission Type    - 9/1/2019 19:53 Urgent Ambulance [address] Medicine Urgent    Arrival Complaint    Neck Pain        Chief Complaint   Patient presents with    Neck Injury     pt brought in by ems from [address] pt was injuryed on the job site and is c/o pain in his neck     Assessment:  Darlene Frausto is a 45 y o  male who presents to the ED as a trauma consultation from urgent care  for a C6 fracture  The patient dropped a Pallet of foam pads off a laron while working 5 days ago  He initially lost his balance and fell onto his low back  The palate hit him on top of his head  He experience a crunching sensation and severe pain in his neck  He went to an urgent care and had plain films that were concerning for facet fracture  He was sent here for CT scan  He complains of neck pain  Denies headache  No focal neuro deficits      Trauma Active Problems:   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 and inferior facet with minimal comminution     Trauma Plan:   CTA neck  Neurosurgery consult  Cassandra collar  Cervical precautions  Analgesia  Neuro checks    9/2 Neurosurgery consult:     Plan:  · Cassandra [place] collar to be worn at all times, Lacy collar for showering  · Upright cervical spine x-rays ordered and pending to assess spinal alignment and if further listhesis occurs  · Medical management and pain control per primary team  · DVT prophylaxis:  SCDs, would hold pharmacological DVT prophylaxis at this time  · Would keep patient NPO for now  · Mobilize as tolerated with assistance, must wear brace, would wait for upright x-rays to be completed  · Neurosurgery will follow during hospitalization  Given SLICs score of 5 and nature of fracture, surgical intervention may be warranted for cervical spine stabilization  Will reassess once upright imaging has been completed    Please call with question or concerns        9/2 Trauma note:     Traumatic closed fracture of C6 vertebra with minimal displacement ([address] )  Assessment & Plan  - Neurosurgery consulted  - No operative intervention during this admission  - PT/OT  - discharge later today             ED Triage Vitals [09/01/19 1957]   Temperature Pulse Respirations Blood Pressure SpO2   98 °F (36 7 °C) 91 18 149/83 98 %      Temp Source Heart Rate Source Patient Position - Orthostatic VS BP Location FiO2 (%)   Oral Monitor -- -- --      Pain Score       7        Wt Readings from Last 1 Encounters:   09/02/19 95 3 kg (210 lb)     Additional Vital Signs:     Date/Time  Temp  Pulse  Resp  BP  MAP (mmHg)  SpO2  O2 Device   09/02/19 07:11:30  97 3 °F    84    112/73  86  96 %     09/02/19 00:39:43  98 3 °F   85  17  116/76  89  96 %     09/01/19 2210    85  18  144/70    98 %         [place] Coma Scale     Date and Time Eye Opening Best Verbal Response Best Motor Response [place] Coma Scale Score   09/02/19 0749 4 5 6 15   09/02/19 0400 4 5 6 15         Pertinent Labs/Diagnostic Test Results:   Results from last 7 days   Lab Units 09/01/19  2229   WBC Thousand/uL 9 02   HEMOGLOBIN g/dL 13 5   HEMATOCRIT % 40 6   PLATELETS Thousands/uL 280   NEUTROS ABS Thousands/µL 4 18         Results from last 7 days   Lab Units 09/01/19  2229   SODIUM mmol/L 137   POTASSIUM mmol/L 3 9   CHLORIDE mmol/L 105   CO2 mmol/L 27   ANION GAP mmol/L 5   BUN mg/dL 11   CREATININE mg/dL 0 86   EGFR ml/min/1 73sq m 107   CALCIUM mg/dL 8 5     Results from last 7 days   Lab Units 09/01/19  2229   AST U/L 21   ALT U/L 23   ALK PHOS U/L 90   TOTAL PROTEIN g/dL 6 7   ALBUMIN g/dL 3 4*   TOTAL BILIRUBIN mg/dL 0 18*         Results from last 7 days   Lab Units 09/01/19  2229   GLUCOSE RANDOM mg/dL 123     9/1 CT cervical spine: 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 and inferior facet with minimal comminution  There is associated rotatory subluxation with anterolisthesis of C6 relative to C7 as a result of the left posterior elements fracture at C6  The fracture line approaches the left C6 transverse foramen and therefore CT angiography is recommended to exclude associated vascular injury  9/1 CT lumbar spine: No acute fracture or traumatic malalignment in the lumbar spine  Mild lumbar degenerative changes  9/2 CTA head and neck: 1  No acute intracranial hemorrhage, midline shift, or mass effect  2   No acute vascular injury identified within the arteries of the neck  3   No high-grade proximal stenosis of the visualized [place] of [place]  4   No significant intracranial arteriovenous malformation or aneurysm  5   Redemonstrated displaced fracture of the C6 facet on the left with stable grade 1 anterolisthesis of C6 on C7  Further evaluation with MRI may be obtained if there is concern for ligamentous injury  ED Treatment:   Medication Administration from 09/01/2019 1953 to 09/02/2019 0036       Date/Time Order Dose Route Action Action by Comments     09/01/2019 2157 acetaminophen (TYLENOL) tablet 975 mg 975 mg Oral Given Soo Yuan RN      09/02/2019 0028 iohexol (OMNIPAQUE) 350 MG/ML injection (MULTI-DOSE) 85 mL 85 mL Intravenous Given Stalin Vitale         History reviewed  No pertinent past medical history  Present on Admission:  **None**      Admitting Diagnosis: Neck pain [M54 2]  C6 cervical fracture ([address] ) [S12 500A]  Age/Sex: 45 y o  male     Admission Orders: Cervical spine precautions, neuro checks Q4H, Aspen collar, cervical brace, NPO        Current Facility-Administered Medications:  acetaminophen 975 mg Oral Q8H [address]   HYDROmorphone 0 5 mg Intravenous Q1H PRN   ondansetron 4 mg Intravenous Q4H PRN   oxyCODONE 10 mg Oral Q4H PRN   oxyCODONE 5 mg Oral Q4H PRN   sodium chloride 75 mL/hr Intravenous Continuous       IP CONSULT TO NEUROSURGERY  IP CONSULT TO CASE MANAGEMENT      Network Utilization Review Department  Phone: [phone]; Fax [phone]  [email or web address]  ATTENTION: Please call with any questions or concerns to [phone]  and carefully listen to the prompts so that you are directed to the right person  Send all requests for admission clinical reviews, approved or denied determinations and any other requests to fax [phone]   All voicemails are confidential

## 2019-09-02 NOTE — DISCHARGE SUMMARY
Discharge Summary - Darlene Frausto 45 y o  male MRN: [id number]    [facility] Encounter: [id number]    Admission Date:   Admission Orders (From admission, onward)     Ordered        09/01/19 2330  Place in Observation  Once                     Admitting Diagnosis: Neck pain [M54 2]  C6 cervical fracture ([address] ) [S12 500A]    HPI: Per resident Evelyn Park MD, "Darlene Frausto is a 45 y o  male who presents as a trauma consultation for a C6 fracture  The patient dropped a Pallet of foam pads off a laron while working 5 days ago  He initially lost his balance and fell onto his low back  The palate hit him on top of his head  He experience a crunching sensation and severe pain in his neck  He went to an urgent care and had plain films that were concerning for facet fracture  He was sent here for CT scan  He complains of neck pain  Denies headache  No focal neuro deficits "    Procedures Performed: No orders of the defined types were placed in this encounter  Summary of Hospital Course:   Patient was admitted to the Trauma service and Neurosurgery was consulted  A cervical brace was recommended with outpatient follow up to consider possible operative management in the future  He was able to be discharged following PT and OT evaluations  Significant Findings, Care, Treatment and Services Provided:     Cta Head And Neck With And Without Contrast    Result Date: 9/2/2019  Impression: 1  No acute intracranial hemorrhage, midline shift, or mass effect  2   No acute vascular injury identified within the arteries of the neck  3   No high-grade proximal stenosis of the visualized [place] of [place]  4   No significant intracranial arteriovenous malformation or aneurysm  5   Redemonstrated displaced fracture of the C6 facet on the left with stable grade 1 anterolisthesis of C6 on C7  Further evaluation with MRI may be obtained if there is concern for ligamentous injury   Workstation performed: [id number]     Xr Spine Cervical 2 Or 3 Vw Injury    Result Date: 9/2/2019  Impression: Stable anterolisthesis of C6 on C7 related to known left C6 facet fracture  Workstation performed: [id number]     Xr Spine Cervical 2 Or 3 Vw Injury    Result Date: 9/1/2019  Impression: Radiographic findings which are quite suspicious for traumatic unilateral jumped facet at C6-C7 level  Further characterization with cervical spine CT is necessary  I personally discussed this study with Santosh Barksdale on 9/1/2019 at 7:17 PM  Workstation performed: [id number]     Xr Spine Lumbar 2 Or 3 Views Injury    Result Date: 9/1/2019  Impression: No acute osseous abnormality  Degenerative changes as described  Workstation performed: [id number]     Trauma - Ct Spine Cervical Wo Contrast    Result Date: 9/1/2019  Impression: 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 and inferior facet with minimal comminution  There is associated rotatory subluxation with anterolisthesis of C6 relative to C7 as a result of the left posterior elements fracture at C6  The fracture line approaches the left C6 transverse foramen and therefore CT angiography is recommended to exclude associated vascular injury  I personally discussed this study with Riley Greco on 9/1/2019 at 10:04 PM   Workstation performed: [id number]     Ct Spine Lumbar Wo Contrast    Result Date: 9/1/2019  Impression: No acute fracture or traumatic malalignment in the lumbar spine  Mild lumbar degenerative changes  Workstation performed: [id number]       Complications:   none    Discharge Diagnosis:   Traumatic closed fracture of C6 vertebra    Resolved Problems  Date Reviewed: 9/2/2019    None          Condition at Discharge: good         Discharge instructions/Information to patient and family:   See after visit summary for information provided to patient and family        Provisions for Follow-Up Care:  See after visit summary for information related to follow-up care and any pertinent home health orders  PCP: Paul Simms    Disposition: Home    Planned Readmission:Yes - possible cervical spine surgery    Discharge Statement   I spent 22 minutes discharging the patient  This time was spent on the day of discharge  I had direct contact with the patient on the day of discharge  Additional documentation is required if more than 30 minutes were spent on discharge  Discharge Medications:  See after visit summary for reconciled discharge medications provided to patient and family

## 2019-09-02 NOTE — ORTHOTIC NOTE
Orthotic Note            Date: 9/2/2019      Patient Name: Syed Thapa            Reason for Consult:  Patient Active Problem List   Diagnosis    Traumatic closed fracture of C6 vertebra with minimal displacement ([address] )     Ortho Tech measured/fit/donned pt for an [employer] while supine in bed  Molded B/L sides and plastic chest flares  Adjusted anterior chin plate to level 2 for optimal fit/comfort  Pt tolerated well without complaint  Instructed pt on proper use and care for the [facility] which pt already had and was wearing upon entering room prior to fitting of [employer]  Reviewed instructions with pt x3  Pt states he has no further questions at this time  ALINA Julia aware  Instructions and [facility] left in pt's room at bedside  Pt call bell, phone/tray within reach  Ortho Tech will continue daily follow up  Recommendations:  Please contact Ortho Tech Ext  [phone] regarding bracing instructions/adjustments  Thank you!   Emily AC, Restorative Technician, [employer]

## 2019-09-02 NOTE — ASSESSMENT & PLAN NOTE
- Neurosurgery consulted  - No operative intervention during this admission  - PT/OT  - discharge later today

## 2019-09-02 NOTE — ASSESSMENT & PLAN NOTE
SLICS 5    Patient presents status post work-related injury where large Pallet fell on head x2  At this time he exhibits a nonfocal examination with good strength and intact sensation, no tenderness to palpation in cervical spine  Imaging reviewed personally with attending, results are as follows:  · 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 and inferior facet with minimal comminution  There is associated rotary subluxation with anterior listhesis of C6 relative to C7 as a result of the left posterior elements fracture at C6  The fracture line approaches the C6 transverse foramen  Plan:  · [place] collar to be worn at all times, Lacy collar for showering  · Upright cervical spine x-rays ordered and pending to assess spinal alignment and if further listhesis occurs  · Medical management and pain control per primary team  · DVT prophylaxis:  SCDs, would hold pharmacological DVT prophylaxis at this time  · Would keep patient NPO for now  · Mobilize as tolerated with assistance, must wear brace, would wait for upright x-rays to be completed  · Neurosurgery will follow during hospitalization  Given SLICs score of 5 and nature of fracture, surgical intervention may be warranted for cervical spine stabilization  Will reassess once upright imaging has been completed  Please call with question or concerns

## 2019-09-02 NOTE — ED ATTENDING ATTESTATION
Lisa Hidalgo MD, saw and evaluated the patient  I have discussed the patient with the resident/non-physician practitioner and agree with the resident's/non-physician practitioner's findings, Plan of Care, and MDM as documented in the resident's/non-physician practitioner's note, except where noted  All available labs and Radiology studies were reviewed  I was present for key portions of any procedure(s) performed by the resident/non-physician practitioner and I was immediately available to provide assistance  At this point I agree with the current assessment done in the Emergency Department  I have conducted an independent evaluation of this patient a history and physical is as follows:      Critical Care Time  Procedures     Patient is a 45 yom, moving foam insulation boards and got hit in the head with the bundle of foam boards  He fell to the ground  This happened Tuesday  No headache  He now notes some pain in the lower back  Mild achi pain in the shoulders and neck  Patient able to be cleared via [facility] rules:  1 ) GCS 15 within 2 hours of injury  2 ) There is no open or depressed skull fracture on physical exam   3 ) No signs of basilar skull fracture  4 ) There was not more than 1 episode of vomiting   5 ) There is no retrograde amnesia to greater than 30 minutes prior to the event  6 ) No dangerous mechanism (fall greater than 3 feet or struck as pedetrian)  7 ) Patient is less than 72years old and older than 16   8 ) Patient is not on anticoagulants  MDM well appearing 45 yom, some midline tenderness to lumbar spine and neck, will image

## 2019-09-02 NOTE — PROGRESS NOTES
Progress Note - Tali [place] 1975, 45 y o  male MRN: [id number]    Unit/Bed#: [facility] 613-01 Encounter: [id number]    Primary Care Provider: Edith Smith   Date and time admitted to hospital: 9/1/2019  7:54 PM        Traumatic closed fracture of C6 vertebra with minimal displacement [facility])  Assessment & Plan  - Neurosurgery consulted  - No operative intervention during this admission  - PT/OT  - discharge later today            Bedside nurse rounds completed with nurse [place]    Prophylaxis: Reason for no pharmacologic prophylaxis cervical fracture    Disposition: home    Code status:  Level 1 - Full Code    Consultants: Neurosurgery    Is the patient 65 years or older?: No    SUBJECTIVE:     Transfer from: n/a  Outside Films Received: not applicable  Tertiary Exam Due on: today    Mechanism of Injury:Other: struck in head    Details related to Injury: +LOC:  no    Chief Complaint: none    HPI/Last 24 hour events: minimal neck discomfort    No parasthesias    Active medications:           Current Facility-Administered Medications:     acetaminophen (TYLENOL) tablet 975 mg, 975 mg, Oral, Q8H LEV, 975 mg at 09/02/19 0641    HYDROmorphone (DILAUDID) injection 0 5 mg, 0 5 mg, Intravenous, Q1H PRN    ondansetron (ZOFRAN) injection 4 mg, 4 mg, Intravenous, Q4H PRN    oxyCODONE (ROXICODONE) immediate release tablet 10 mg, 10 mg, Oral, Q4H PRN    oxyCODONE (ROXICODONE) IR tablet 5 mg, 5 mg, Oral, Q4H PRN    sodium chloride 0 9 % infusion, 75 mL/hr, Intravenous, Continuous, 75 mL/hr at 09/02/19 0520      OBJECTIVE:     Vitals:   Vitals:    09/02/19 0711   BP: 112/73   Pulse: 84   Resp: 19   Temp: (!) 97 3 °F (36 3 °C)   SpO2: 96%       Physical Exam:   Constitution: patient lying in bed, appears comfortable  HEENT: normocephalic, atraumatic, 3 mm SARI, clear speech  CV: regular rate and rhythm, +2 DP pulses  Pulm: CTA, no wheezes, rhonchi or crackles, unlabored, equal bilaterally  Abd: soft, nontender, nondistended, active bowel sounds  Extremities: moves all extremities, equal strength, no edema, no skin breakdown  Neuro: A&O, no focal deficits  Skin: no rash or breakdown, warm            I/O:   I/O       08/31 0701 - 09/01 0700 09/01 0701 - 09/02 0700 09/02 0701 - 09/03 0700    P  O    0    Total Intake(mL/kg)   0 (0)    Net   0           Unmeasured Urine Occurrence  2 x           Invasive Devices: Invasive Devices     Peripheral Intravenous Line            Peripheral IV 09/01/19 Left Antecubital less than 1 day                  Imaging:   Cta Head And Neck With And Without Contrast    Result Date: 9/2/2019  Impression: 1  No acute intracranial hemorrhage, midline shift, or mass effect  2   No acute vascular injury identified within the arteries of the neck  3   No high-grade proximal stenosis of the visualized [place]  4   No significant intracranial arteriovenous malformation or aneurysm  5   Redemonstrated displaced fracture of the C6 facet on the left with stable grade 1 anterolisthesis of C6 on C7  Further evaluation with MRI may be obtained if there is concern for ligamentous injury  Workstation performed: [id number]     Xr Spine Cervical 2 Or 3 Vw Injury    Result Date: 9/1/2019  Impression: Radiographic findings which are quite suspicious for traumatic unilateral jumped facet at C6-C7 level  Further characterization with cervical spine CT is necessary  I personally discussed this study with Rome Wiley on 9/1/2019 at 7:17 PM  Workstation performed: [id number]     Xr Spine Lumbar 2 Or 3 Views Injury    Result Date: 9/1/2019  Impression: No acute osseous abnormality  Degenerative changes as described  Workstation performed: [id number]     Trauma - Ct Spine Cervical Wo Contrast    Result Date: 9/1/2019  Impression: 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 and inferior facet with minimal comminution    There is associated rotatory subluxation with anterolisthesis of C6 relative to C7 as a result of the left posterior elements fracture at C6  The fracture line approaches the left C6 transverse foramen and therefore CT angiography is recommended to exclude associated vascular injury  I personally discussed this study with Michelle Dickinson on 9/1/2019 at 10:04 PM   Workstation performed: [id number]     Ct Spine Lumbar Wo Contrast    Result Date: 9/1/2019  Impression: No acute fracture or traumatic malalignment in the lumbar spine  Mild lumbar degenerative changes   Workstation performed: [id number]       Labs:   CBC:   Lab Results   Component Value Date    WBC 9 02 09/01/2019    HGB 13 5 09/01/2019    HCT 40 6 09/01/2019    MCV 96 09/01/2019     09/01/2019    MCH 31 9 09/01/2019    MCHC 33 3 09/01/2019    RDW 13 5 09/01/2019    MPV 9 7 09/01/2019    NRBC 0 09/01/2019     CMP:   Lab Results   Component Value Date     09/01/2019    CO2 27 09/01/2019    BUN 11 09/01/2019    CREATININE 0 86 09/01/2019    CALCIUM 8 5 09/01/2019    AST 21 09/01/2019    ALT 23 09/01/2019    ALKPHOS 90 09/01/2019    EGFR 107 09/01/2019

## 2019-09-02 NOTE — OCCUPATIONAL THERAPY NOTE
OT CANCEL NOTE    OT orders received  Chart reviewed  Pt is currently pending NSx consult and possible OR  Will hold initial OT evaluation  Will continue to follow pt on caseload and see pt when medically stable and as clinically appropriate      Marie VALE, OTR/L

## 2019-09-03 ENCOUNTER — TELEPHONE (OUTPATIENT)
Dept: NEUROSURGERY | Facility: CLINIC | Age: 44
End: 2019-09-03

## 2019-09-03 ENCOUNTER — TRANSITIONAL CARE MANAGEMENT (OUTPATIENT)
Dept: FAMILY MEDICINE CLINIC | Facility: CLINIC | Age: 44
End: 2019-09-03

## 2019-09-03 NOTE — TELEPHONE ENCOUNTER
2019-CALLED PT AND SCHEDULED WITH HIM 2 [facility] F/U WITH [facility] ON 2019 AT 10:15am IN [place]  CONFIRMED WITH PT TO HAVE XRAY COMPLETED PRIOR TO APT     ----- Message from Leticia Chowdhury PA-C sent at 2019 12:02 PM EDT -----  Regardin week hospital follow up appointment needed  Please call patient and set up a 2 week hospital follow up with repeat XR cervical spine  Would appreciate an appointment at the [facility] if possible  Thank you!

## 2019-09-05 ENCOUNTER — APPOINTMENT (OUTPATIENT)
Dept: URGENT CARE | Age: 44
End: 2019-09-05
Payer: OTHER MISCELLANEOUS

## 2019-09-05 PROCEDURE — 99213 OFFICE O/P EST LOW 20 MIN: CPT | Performed by: PREVENTIVE MEDICINE

## 2019-09-11 ENCOUNTER — HOSPITAL ENCOUNTER (OUTPATIENT)
Dept: RADIOLOGY | Facility: HOSPITAL | Age: 44
Discharge: HOME | End: 2019-09-11
Payer: OTHER MISCELLANEOUS

## 2019-09-11 DIAGNOSIS — S12.500A: ICD-10-CM

## 2019-09-11 DIAGNOSIS — S12.500A C6 CERVICAL FRACTURE (HCC): ICD-10-CM

## 2019-09-11 PROCEDURE — 72040 X-RAY EXAM NECK SPINE 2-3 VW: CPT

## 2019-09-16 ENCOUNTER — OFFICE VISIT (OUTPATIENT)
Dept: NEUROSURGERY | Facility: CLINIC | Age: 44
End: 2019-09-16
Payer: OTHER MISCELLANEOUS

## 2019-09-16 VITALS
HEIGHT: 72 IN | WEIGHT: 206.3 LBS | DIASTOLIC BLOOD PRESSURE: 84 MMHG | RESPIRATION RATE: 16 BRPM | HEART RATE: 96 BPM | SYSTOLIC BLOOD PRESSURE: 102 MMHG | TEMPERATURE: 98 F | BODY MASS INDEX: 27.94 KG/M2

## 2019-09-16 DIAGNOSIS — S12.500D CLOSED TRAUMATIC MINIMALLY DISPLACED FRACTURE OF SIXTH CERVICAL VERTEBRA WITH ROUTINE HEALING, SUBSEQUENT ENCOUNTER: Primary | ICD-10-CM

## 2019-09-16 PROCEDURE — 99214 OFFICE O/P EST MOD 30 MIN: CPT | Performed by: PHYSICIAN ASSISTANT

## 2019-09-16 RX ORDER — OXYCODONE HYDROCHLORIDE 5 MG/1
5 CAPSULE ORAL AS NEEDED
COMMUNITY
End: 2019-11-25 | Stop reason: ALTCHOICE

## 2019-09-16 NOTE — PROGRESS NOTES
Assessment/Plan:     Diagnoses and all orders for this visit:    Closed traumatic minimally displaced fracture of sixth cervical vertebra with routine healing, subsequent encounter  -     XR spine cervical 2 or 3 vw injury; Future        · Exam: GCS 15, AAO X 3, TTP of Cervical spine, SNIDER, strength 5/5 throughout, sensation intact to LT/PP X 4, Reflexes 2+ and symmetric, no bennett's or clonus, no drift bilaterally  · Imaging reviewed personally and by attending  Final results as below  · Xray Cervical 9/11/19: Stable grade 1 anterolisthesis of C6 on C7 relating to a known left C6 facet fracture  · CT Cervical spine 9/1/19: 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 and inferior facet with minimal comminution  There is associated rotatory subluxation with anterolisthesis of C6 relative to C7 as a result of the left posterior elements fracture at C6  The fracture line approaches the left C6 transverse foramen   · CTA Head and neck w wo 9/2/19: No acute intracranial hemorrhage, midline shift, or mass effect  2  No acute vascular injury identified within the arteries of the neck  · Xray Cervical spine 9/2/19: Stable anterolisthesis of C6 on C7 is noted related to known C6 fracture  No significant cervical degenerative change is noted  Scratch Prevertebral soft tissues appear unremarkable  · Pain control with Tylenol as needed and Oxycodone as prescribed  · Showed pt his imaging and discussed the findings with patient and his fiancee  The Grade 1 anterolisthesis at C6/C7 is stable  Cervical alignment is stable  Pt continues to have mild neck pain  At this time, pt advised to continue conservative management by wearing the Cervical brace at all times  Switch to [place] collar for showers  No neurosurgical intervention is anticipated at this time  · Patient will have a follow-up appointment with neurosurgery in 1 month with repeat Xray of Cervical spine     · Discussed with pt to continue safety precautions, avoid falls or further injury to the neck  Avoid strenuous activity or lifting greater than 10 lbs  In addition pt should not drive while using the cervical brace  Advised pt to contact our office or go to ED for evaluation should he experience any new or worsening neck pain, numbness, tingling, weakness or paralysis in his hands or legs  · Discussed plan of care with patient  Patient is agreeable to the plan  · Patient made aware to contact neurosurgery with any questions or concerns  Subjective:      Patient ID: Tali Serna is a 45 y o  male  HPI    Pt is a 44 yo male who was initially seen by neurosurgery on 9/2/19 when he presented to ED s/p pallet falling on head X 2  Pt was had started a new job as a   He was moving pallets when the top pallet fell on his head and he fell fo the ground the a pallet fell on his again  Pt complained of neck pain and was found to have a left C6 facet fx- SLICS 5  Neurosurgery discussed surgical intervention with pt then but pt preferred to have a trial of conservative management  Pt was advised to wear a Cervical brace at all times and return to the neurosurgery office for follow up in 2 week with repeat Xray of Cervical spine  Pt presents today to the neurosurgery office for this follow up  Pt reports he continues to have neck pain on the left side of his neck   Pt rates his neck pain as 3/10 on the pain scale  Pt reports some movements aggrave his his left sided neck pain  Pt denies numbness, tingling or weakness in bilateral arms or legs  Pt reports he has continued to use the [place] Cervical brace and has a Lacy collar  SH: Pt repots that he had just started a new job as a  and had worked for about a week when he was injured in the neck  Pt reports he is off from work at this time and has Worker's Comp for his injury  Pt presented to this appointment with his [place]       The following portions of the patient's history were reviewed and updated as appropriate: allergies, current medications, past family history, past medical history, past social history, past surgical history and problem list     Review of Systems   Constitutional: Negative  HENT: Negative  Eyes: Negative  Respiratory: Negative  Cardiovascular: Negative  Gastrointestinal: Negative  Endocrine: Negative  Genitourinary: Negative  Musculoskeletal: Positive for neck pain and neck stiffness  Skin: Negative  Allergic/Immunologic: Negative  Neurological: Positive for headaches  Hematological: Negative  Psychiatric/Behavioral: Negative  All other systems reviewed and are negative  Objective:      /84 (BP Location: Left arm, Patient Position: Sitting, Cuff Size: Adult)   Pulse 96   Temp 98 °F (36 7 °C) (Tympanic)   Resp 16   Ht 6' (1 829 m)   Wt 93 6 kg (206 lb 4 8 oz)   BMI 27 98 kg/m²          Physical Exam   Constitutional: He is oriented to person, place, and time  He appears well-developed and well-nourished  HENT:   Head: Normocephalic and atraumatic  Eyes: Pupils are equal, round, and reactive to light  Conjunctivae and EOM are normal  No scleral icterus  Neck: Neck supple  No tracheal deviation present  Cervical [place] brace in place  Cardiovascular: Normal rate  Pulmonary/Chest: Effort normal    Abdominal: Soft  There is no tenderness  There is no guarding  Musculoskeletal: He exhibits no edema  GCS 15, AAO X 3, TTP of Cervical spine, SNIDER, strength 5/5 throughout, sensation intact to LT/PP X 4, Reflexes 2+ and symmetric, no bennett's or clonus, no drift bilaterally  Neurological: He is alert and oriented to person, place, and time  Skin: Skin is warm and dry  No rash noted  No pallor  Psychiatric: He has a normal mood and affect   His behavior is normal

## 2019-09-16 NOTE — PATIENT INSTRUCTIONS
Neurosurgery discharge instructions following neck fracture:      Follow up with us in 1 month with repeat Xray of the neck  Have xray done 1-2 days before your appt   Continue to use the Cervical brace to be worn at all times  Switch to [place] collar for showers   Recommended to refrain from strenuous activity   Recommended to limit lifting, pulling, pushing to no more then 10 lbs   No driving while being treated in brace   Recommended that pt take fall precautions and refrain from activity that increases chance of fall or injury to neck  Please notify our office or go to the ED immediately if you have increased back or leg pain  New numbness, tingling and/or weakness in your hands or legs

## 2019-09-27 ENCOUNTER — APPOINTMENT (OUTPATIENT)
Dept: URGENT CARE | Age: 44
End: 2019-09-27

## 2019-09-30 DIAGNOSIS — S12.500A: ICD-10-CM

## 2019-10-01 RX ORDER — GABAPENTIN 100 MG/1
CAPSULE ORAL
Qty: 90 CAPSULE | Refills: 0 | Status: SHIPPED | OUTPATIENT
Start: 2019-10-01 | End: 2019-10-14 | Stop reason: DRUGHIGH

## 2019-10-14 ENCOUNTER — APPOINTMENT (OUTPATIENT)
Dept: RADIOLOGY | Facility: MEDICAL CENTER | Age: 44
End: 2019-10-14
Payer: OTHER MISCELLANEOUS

## 2019-10-14 ENCOUNTER — OFFICE VISIT (OUTPATIENT)
Dept: PAIN MEDICINE | Facility: MEDICAL CENTER | Age: 44
End: 2019-10-14
Payer: OTHER MISCELLANEOUS

## 2019-10-14 VITALS
HEIGHT: 72 IN | BODY MASS INDEX: 26.82 KG/M2 | SYSTOLIC BLOOD PRESSURE: 112 MMHG | HEART RATE: 72 BPM | DIASTOLIC BLOOD PRESSURE: 74 MMHG | WEIGHT: 198 LBS

## 2019-10-14 DIAGNOSIS — M79.18 MYOFASCIAL PAIN SYNDROME: ICD-10-CM

## 2019-10-14 DIAGNOSIS — S12.500A: Primary | ICD-10-CM

## 2019-10-14 DIAGNOSIS — S12.500D CLOSED TRAUMATIC MINIMALLY DISPLACED FRACTURE OF SIXTH CERVICAL VERTEBRA WITH ROUTINE HEALING, SUBSEQUENT ENCOUNTER: ICD-10-CM

## 2019-10-14 DIAGNOSIS — M54.2 NECK PAIN: ICD-10-CM

## 2019-10-14 PROCEDURE — 72040 X-RAY EXAM NECK SPINE 2-3 VW: CPT

## 2019-10-14 PROCEDURE — 99204 OFFICE O/P NEW MOD 45 MIN: CPT | Performed by: PHYSICAL MEDICINE & REHABILITATION

## 2019-10-14 RX ORDER — GABAPENTIN 300 MG/1
300 CAPSULE ORAL
Qty: 30 CAPSULE | Refills: 1 | Status: SHIPPED | OUTPATIENT
Start: 2019-10-14 | End: 2020-01-13 | Stop reason: ALTCHOICE

## 2019-10-14 RX ORDER — METHOCARBAMOL 750 MG/1
750 TABLET, FILM COATED ORAL 2 TIMES DAILY PRN
Qty: 60 TABLET | Refills: 0 | Status: SHIPPED | OUTPATIENT
Start: 2019-10-14 | End: 2020-01-13 | Stop reason: SDUPTHER

## 2019-10-14 NOTE — PATIENT INSTRUCTIONS
Trigger Point Pain   WHAT YOU NEED TO KNOW:   What is a trigger point? Trigger points are often called muscle knots  They are a tight lump in an area of muscle that can cause pain  Where might trigger points occur? You may have trigger points in your neck, shoulders, or upper and lower back  You may have them in your head or jaws  You may also have them in your buttocks or legs  You may only have one trigger point or you may have many in the different areas of your body  What causes trigger points? Trigger points may be caused by muscle injury  They may also form if you use the muscle too much, or you have repeated minor stress to the muscle  Minor stress may result from things such as poor posture and sleep position  Emotional stress may also cause trigger points  This can happen when stress makes you tense certain muscles, such as those in your shoulders and neck  What are the signs and symptoms of trigger points? · Pain:  Trigger points can cause deep, aching pain  They may cause pain only when the trigger point is pressed  They may also cause constant pain, or pain during movement of the muscle  Pain may spread away from the trigger point  Pain may also occur in another part of your body  For example, a trigger point in your neck may cause eye pain  This is called referred pain  · Decreased range of motion:  Range of motion is how much you can move a joint, such as your shoulder or knee  A trigger point can shorten a muscle  This can reduce the range of motion of a nearby joint  · Muscle weakness: The pain caused by a trigger point may weaken the muscle  · Other signs and symptoms: You may be dizzy or hear ringing in your ears  Your skin over the trigger point may turn red  Your skin may tingle and be sensitive to the touch  Trigger points may also cause your mouth may make extra saliva and your eyes may make extra tears  How are trigger points diagnosed?   Your healthcare provider will ask about your health history  He will also ask you to describe your pain  You may need certain tests to rule out other conditions  Your healthcare provider will look for a hard lump in your muscle  He may press on or squeeze the tissue over this lump  He will do this to see if the muscle twitches (quick movements) and if you have any pain  These signs help your healthcare provider know if you have a trigger point  You may also need tests to make sure your pain is not related to a more serious condition  Tests include:  · X-ray: This is a picture taken of your bones and tissues to look for possible causes of your pain  · Computed tomography scan: This is also called a CT scan  A special x-ray machine uses a computer to take pictures of your body  It may be used to look at your bones and muscles  You may be given dye in an IV before the pictures are taken  The dye may help your healthcare provider see the pictures better  People who are allergic to iodine or shellfish (lobster, crab, or shrimp) may be allergic to some dyes  Tell your healthcare provider if you are allergic to shellfish, or have other allergies or medical conditions  · Magnetic resonance imaging: This test is called an MRI  During the MRI, pictures are taken of your body  An MRI may be used to look at your bones and muscles  You will need to lie still during the MRI  Never enter the MRI room with any metal objects  This can cause serious injury  How are trigger points treated? · Trigger point injections:  A healthcare provider puts a needle through your skin and into the trigger point  Saline (salt solution), pain relievers, or other medicines may be pushed through the needle into the trigger point  Healthcare providers may also use only a dry needle (no medicine)  When the needle is removed, the muscle area is gently stretched  · Spray and stretch:  A cooling substance is sprayed on your skin over the trigger point   This helps relax the muscle, which is then gently stretched  · Massage: Your healthcare provider may massage the muscle that contains the trigger point  He may also perform compression therapy  This is when he presses on the trigger point until the muscle relaxes  These methods help relax and stretch the muscle  · Transcutaneous electrical nerve stimulation: This is also called TENS  During TENS, pads are placed on the trigger point  They may also be placed where you have referred pain  The pads are attached to a device that sends electrical signals into your body  These signals help reduce pain  · Physical therapy:  A physical therapist helps you with special exercises  These exercises help gently stretch your muscles and relieve pain  Your therapist may also help you find the causes of your trigger points, such as poor posture  · Medicines: Your healthcare provider may suggest pain medicines such as ibuprofen  He may also prescribe medicines that relax your muscles  What are the risks of treatment for trigger points? You may bruise or get an infection in the area your trigger point injections (shots) were given  The shots may cause you to feel faint  You may also be sore where you got the shot  The needle may cause muscle damaged  A collapsed lung is a risk if you get the shot near your lungs  How can I manage my trigger point pain? Follow all instructions your healthcare provider gives you  He may tell you to do the following:  · Stay active after you have trigger point injections  Gently move your joints through their full range of motion during the first week  Avoid strenuous activity for 3 or 4 days  · Do regular stretches of the trigger point muscle  Place gentle pressure on the trigger point, and then stretch the muscle  Ask your healthcare provider for more information about how to stretch and apply pressure  · Apply ice or heat to the pain site   A bag of ice covered with a towel or a heating pad can help ease pain for a short time  Apply ice or heat as directed  When should I call my healthcare provider? · Your pain has not improved after 2 to 3 sessions of trigger point injections  · You have questions or concerns about your trigger point pain  When should I seek immediate care or call 911? · You have chest pain or trouble breathing that starts suddenly  CARE AGREEMENT:   You have the right to help plan your care  Learn about your health condition and how it may be treated  Discuss treatment options with your caregivers to decide what care you want to receive  You always have the right to refuse treatment  The above information is an  only  It is not intended as medical advice for individual conditions or treatments  Talk to your doctor, nurse or pharmacist before following any medical regimen to see if it is safe and effective for you  © 2017 [address] [employer] Information is for End User's use only and may not be sold, redistributed or otherwise used for commercial purposes  All illustrations and images included in CareNotes® are the copyrighted property of A D A M , Inc  or Vishal Prince

## 2019-10-14 NOTE — PROGRESS NOTES
Assessment  1  Closed traumatic minimally displaced fracture of sixth cervical vertebra, initial encounter ([facility] [address] )    2  Neck pain    3  Myofascial pain syndrome        Plan  Mr Shahla Haddad is a pleasant 43-year-old male who presents for neck pain that started after a accident at work on August 27, 2019  Today he is demonstrating clinical evidence of myofascial pain syndrome of bilateral trapezius muscles as well as the neck pain that is likely a result of the 6th cervical vertebrae fracture  He is due to see the surgeon this coming Friday October 18th for re-evaluation and questionable surgical consideration  He remains in a cervical collar for the forseeable future at least 2 months and likely more  At this time we will  1  Plan for trigger point injections to the bilateral trapezius muscle secondary to myofascial pain syndrome  2  Continue gabapentin 300 mg at night with plan to titrate up as patient tolerates  3  Continue short course of the muscle relaxants Robaxin twice a day as needed  Patient is aware that he will be tapered off the medication  4  Advised he can take over-the-counter Tylenol as needed and educated him that he can take no more than 3000 mg per day  5  We will see him in office for trigger point injections and plan of care moving forward     My impressions and treatment recommendations were discussed in detail with the patient who verbalized understanding and had no further questions  Discharge instructions were provided  I personally saw and examined the patient and I agree with the above discussed plan of care  No orders of the defined types were placed in this encounter      New Medications Ordered This Visit   Medications    methocarbamol (ROBAXIN) 750 mg tablet     Sig: Take 1 tablet (750 mg total) by mouth 2 (two) times a day as needed for muscle spasms     Dispense:  60 tablet     Refill:  0    gabapentin (NEURONTIN) 300 mg capsule     Sig: Take 1 capsule (300 mg total) by mouth daily at bedtime     Dispense:  30 capsule     Refill:  1       History of Present Illness    Jeremy Lara is a 45 y o  male Mr Roxann Freitas is a pleasant 44-year-old male with significant past medical history of a work-related injury on August 27, 2019 in which he was using a manual laron to move 11 ft sheets for his job and 1 of them fell on top of his head  Subsequently suffered multiple Neck related injuries including a closed traumatic minimally displaced fracture of the 6th cervical vertebrae  Today he presents in a cervical collar which apparently cannot be removed for at least 2 months  He reports his pain to be primarily in the neck and low back rated 8/10 and interfering with his daily activities  Pain is moderate to severe and constant 100% of the time  He reports the pain to be worse in the morning and describes the pain as burning, cramping, shooting, dull and aching  Pain is worsened with lying down, bending, walking, exercise, relaxation  Pain is improved with over-the-counter Tylenol, Percocet, Robaxin, gabapentin  Presents today for evaluation of worsening neck pain    I have personally reviewed and/or updated the patient's past medical history, past surgical history, family history, social history, current medications, allergies, and vital signs today  Review of Systems   Constitutional: Negative for activity change  HENT: Negative for congestion  Eyes: Negative for discharge  Respiratory: Negative for apnea  Cardiovascular: Negative for chest pain  Gastrointestinal: Negative for abdominal distention  Endocrine: Negative for cold intolerance  Genitourinary: Negative for difficulty urinating  Musculoskeletal: Positive for arthralgias, myalgias and neck pain  Negative for joint swelling  Skin: Negative for color change  Allergic/Immunologic: Positive for immunocompromised state  Neurological: Positive for headaches  Negative for dizziness     Hematological: Negative for adenopathy  Psychiatric/Behavioral: Negative for agitation  Patient Active Problem List   Diagnosis    Traumatic closed fracture of C6 vertebra with minimal displacement (HCC)       Past Medical History:   Diagnosis Date    Asthma        Past Surgical History:   Procedure Laterality Date    CHOLECYSTECTOMY      GALLBLADDER SURGERY N/A 2010       Family History   Problem Relation Age of Onset    No Known Problems Mother     No Known Problems Father        Social History     Occupational History    Not on file   Tobacco Use    Smoking status: Never Smoker    Smokeless tobacco: Never Used   Substance and Sexual Activity    Alcohol use: Not Currently    Drug use: Not Currently    Sexual activity: Not on file       Current Outpatient Medications on File Prior to Visit   Medication Sig    acetaminophen (TYLENOL) 325 mg tablet Take 3 tablets (975 mg total) by mouth every 8 (eight) hours (Patient taking differently: Take 975 mg by mouth as needed )    oxyCODONE (OXY-IR) 5 MG capsule Take 5 mg by mouth as needed for moderate pain    [DISCONTINUED] gabapentin (NEURONTIN) 100 mg capsule TAKE 1 CAPSULE BY MOUTH THREE TIMES A DAY    [DISCONTINUED] methocarbamol (ROBAXIN) 750 mg tablet Take 1 tablet (750 mg total) by mouth every 6 (six) hours     No current facility-administered medications on file prior to visit  No Known Allergies    Physical Exam    /74   Pulse 72   Ht 6' (1 829 m)   Wt 89 8 kg (198 lb)   BMI 26 85 kg/m²     General: Well-developed, well-nourished individual in no acute distress  Sitting comfortably in cervical collar  Mental: Appropriate mood and affect  Grossly oriented with coherent speech and thought processing  Neuro:  Cranial nerves: Cranial nerve function is grossly intact bilaterally  Strength: Bilateral upper extremity strength is normal and symmetric  No atrophy or tone abnormalities noted    Reflexes: Bilateral upper extremity muscle stretch reflexes are physiologic and symmetric  No Laguerre sign  Sensation: No loss of sensation is noted  Foraminal Compression Maneuvers:  Spurling sign unable to perform secondary to clinical presentation and limitation in collar  Gait:  Gait/gross motor: Gait is normal  Station is normal      Musculoskeletal:  Palpation: Inspection and palpation of the spine and extremities are remarkable for tenderness to palpation of the bilateral trapezius muscles and cervical paraspinals  Spine:  Limited active and passive range of motion in collar  Skin: Skin inspection grossly negative for erythema, breakdown, or concerning lesions in affected area  Lymph: No lymphadenopathy is appreciated in the involved extremity  Vessels: No lower extremity edema  Lungs: Breathing is comfortable and regular  No dyspnea noted during examination  Eyes: Visual field grossly intact to confrontation  No redness appreciated  ENT: No craniofacial deformities or asymmetry  No neck masses appreciated  Imaging    CERVICAL SPINE 09/01/19     INDICATION:  M54 2: Cervicalgia  Fall  Neck pain      COMPARISON:  None     EXAM PERFORMED/VIEWS:  XR SPINE CERVICAL 2 OR 3 VW INJURY     IMAGES:  4     FINDINGS:     There is anterolisthesis of C6 relative to C7  In addition, on the frontal radiograph, the spinous processes at the C2 through the C6 level are rotated towards the left relative to spinous processes of C7 and below  The findings are suspicious for   unilateral jumped facet at the C6/C7 level and more accurate characterization with cervical spine CT is recommended      No significant degenerative change  Prevertebral soft tissues appear unremarkable  No significant abnormality identified in the visualized lung apices      IMPRESSION:     Radiographic findings which are quite suspicious for traumatic unilateral jumped facet at C6-C7 level    Further characterization with cervical spine CT is necessary      CT CERVICAL SPINE - WITHOUT CONTRAST 09/01/19     INDICATION:   trauma  Neck injury  Neck pain  Abnormality on cervical spine radiograph performed earlier the same day      COMPARISON:  Cervical spine radiograph performed September 1, 2019     TECHNIQUE:  CT examination of the cervical spine was performed without intravenous contrast   Contiguous axial images were obtained  Sagittal and coronal reconstructions were performed        Radiation dose length product (DLP) for this visit:  383 3 mGy-cm   This examination, like all CT scans performed in the [facility], was performed utilizing techniques to minimize radiation dose exposure, including the use of iterative   reconstruction and automated exposure control        IMAGE QUALITY:  Diagnostic      FINDINGS:     There is minimally comminuted fracture of the left pars interarticularis and inferior facet of the C6 vertebral segment resulting in unilateral rotatory ventral subluxation of C6 relative to C7  C6 ventrally subluxed as much is 4 mm relative to C7  The   fracture line approaches the posterior margin of the transverse foramen of C6  No other cervical spine fractures are noted  No other evidence of malalignment      No destructive bony lesion  No significant degenerative change  Prevertebral and paraspinal soft tissues appear unremarkable  Unremarkable CT appearance of the structures of the thoracic inlet      IMPRESSION:     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 and inferior facet with minimal comminution  There is associated rotatory subluxation with anterolisthesis of C6 relative to C7 as a result of the left posterior elements fracture at C6      The fracture line approaches the left C6 transverse foramen and therefore CT angiography is recommended to exclude associated vascular injury        CERVICAL SPINE 09/02/19     INDICATION: C6 left facet fracture, assess spinal alignment     COMPARISON:  9/1/2019      EXAM PERFORMED/VIEWS:  XR SPINE CERVICAL 2 OR 3 VW INJURY     IMAGES:  3     FINDINGS:     Stable anterolisthesis of C6 on C7 is noted related to known C6 fracture      No significant cervical degenerative change is noted  Scratch Prevertebral soft tissues appear unremarkable      No significant abnormality identified in the visualized lung apices      IMPRESSION:     Stable anterolisthesis of C6 on C7 related to known left C6 facet fracture        CERVICAL SPINE 09/11/19     INDICATION:   S12 500A: Unspecified displaced fracture of sixth cervical vertebra, initial encounter for closed fracture  S12 500A: Unspecified displaced fracture of sixth cervical vertebra, initial encounter for closed fracture      COMPARISON:  September 2, 2019     VIEWS:  XR SPINE CERVICAL 2 OR 3 VW INJURY   Images: 3     FINDINGS: Bone mineralization within normal limits      Slight concave left cervicothoracic scoliotic curvature      Stable grade 1 anterolisthesis of C6 on C7       Persistent prevertebral soft tissue swelling at C5-C7 levels     The intervertebral disc spaces are otherwise preserved      No new injuries are found      Visualized lung apices are clear         IMPRESSION:  Stable grade 1 anterolisthesis of C6 on C7 relating to a known left C6 facet fracture

## 2019-10-18 ENCOUNTER — OFFICE VISIT (OUTPATIENT)
Dept: NEUROSURGERY | Facility: CLINIC | Age: 44
End: 2019-10-18
Payer: OTHER MISCELLANEOUS

## 2019-10-18 VITALS
DIASTOLIC BLOOD PRESSURE: 90 MMHG | TEMPERATURE: 99.2 F | HEIGHT: 72 IN | BODY MASS INDEX: 27.74 KG/M2 | SYSTOLIC BLOOD PRESSURE: 122 MMHG | WEIGHT: 204.8 LBS | HEART RATE: 96 BPM

## 2019-10-18 DIAGNOSIS — S12.500D CLOSED TRAUMATIC MINIMALLY DISPLACED FRACTURE OF SIXTH CERVICAL VERTEBRA WITH ROUTINE HEALING, SUBSEQUENT ENCOUNTER: Primary | ICD-10-CM

## 2019-10-18 PROCEDURE — 99213 OFFICE O/P EST LOW 20 MIN: CPT | Performed by: PHYSICIAN ASSISTANT

## 2019-10-18 NOTE — ASSESSMENT & PLAN NOTE
Left C6 facet fracture with grade 1 anterolisthesis of C6 on C7 s/p head injury at work when a pallet fell on his head 9/2/19  Patient c/o continued 6/10 neck pain, muscular in nature  Follows with pain management, next appointment in 2 weeks  No BUE symptoms, BLE symptoms, or myelopathic symptoms    Imaging:  Xray cervicals spine, 10/14/19: Stable anterolisthesis C6 on C7  Secondary to known facet fracture      Plan:   Continue to wear cervical collar at all times with arsenio collar to shower  Pain control with tylenol  Continue to follow up with pain management as needed  Cervical spine precautions as discussed  Return in 4 weeks after Xray cervical spine with flexion/extension views; at that time we will discuss weaning out of brace if no instability  PT for neck strengthening after collar discontinued

## 2019-10-18 NOTE — PROGRESS NOTES
Neurosurgery Office Note  Wayne Bleacher [place] 45 y o  male MRN: [id number]      Assessment/Plan     Traumatic closed fracture of C6 vertebra with minimal displacement ([address] )  Left C6 facet fracture with grade 1 anterolisthesis of C6 on C7 s/p head injury at work when a pallet fell on his head 9/2/19  Patient c/o continued 6/10 neck pain, muscular in nature  Follows with pain management, next appointment in 2 weeks  No BUE symptoms, BLE symptoms, or myelopathic symptoms    Imaging:  Xray cervicals spine, 10/14/19: Stable anterolisthesis C6 on C7  Secondary to known facet fracture  Plan:   Continue to wear cervical collar at all times with arsenio collar to shower  Pain control with tylenol  Continue to follow up with pain management as needed  Cervical spine precautions as discussed  Return in 4 weeks after Xray cervical spine with flexion/extension views; at that time we will discuss weaning out of brace if no instability  PT for neck strengthening after collar discontinued       Diagnoses and all orders for this visit:    Closed traumatic minimally displaced fracture of sixth cervical vertebra with routine healing, subsequent encounter  -     XR spine cervical complete 4 or 5 vw non injury; Future            CHIEF COMPLAINT    Chief Complaint   Patient presents with    Follow-up     Closed minimally displaced fracture of 6th cervical vertebra        HISTORY    44 yo male who sustained a neck injury at work on 9/2/19 when a wood pallet fell onto his head  Imaging showed a left sided C6 facet fracture with grade 1 anterolisthesis of C6 on C7  Fortunately he suffered no signs of cord injury  He was maintained in a cervical collar with spine precautions and pain control  Today he admits to continued neck pain, although upon further evaluation this appears to be more paraspinal as opposed to bony pain  He denies pain, numbness, weakness, or paresthesias of his BUE or BLE   He denies myelopathic complaints       REVIEW OF SYSTEMS    Review of Systems   Musculoskeletal: Positive for arthralgias (shoulders), gait problem and neck pain  Negative for back pain, joint swelling, myalgias and neck stiffness  Allergic/Immunologic: Negative  All other systems reviewed and are negative  Meds/Allergies     Current Outpatient Medications   Medication Sig Dispense Refill    acetaminophen (TYLENOL) 325 mg tablet Take 3 tablets (975 mg total) by mouth every 8 (eight) hours (Patient taking differently: Take 975 mg by mouth as needed ) 30 tablet 0    gabapentin (NEURONTIN) 300 mg capsule Take 1 capsule (300 mg total) by mouth daily at bedtime 30 capsule 1    methocarbamol (ROBAXIN) 750 mg tablet Take 1 tablet (750 mg total) by mouth 2 (two) times a day as needed for muscle spasms 60 tablet 0    oxyCODONE (OXY-IR) 5 MG capsule Take 5 mg by mouth as needed for moderate pain       No current facility-administered medications for this visit  No Known Allergies    PAST HISTORY    Past Medical History:   Diagnosis Date    Asthma        Past Surgical History:   Procedure Laterality Date    CHOLECYSTECTOMY      GALLBLADDER SURGERY N/A 2010       Social History     Tobacco Use    Smoking status: Never Smoker    Smokeless tobacco: Never Used   Substance Use Topics    Alcohol use: Not Currently    Drug use: Not Currently       Family History   Problem Relation Age of Onset    No Known Problems Mother     No Known Problems Father          Above history personally reviewed  EXAM    Vitals:Blood pressure 122/90, pulse 96, temperature 99 2 °F (37 3 °C), temperature source Temporal, height 6' (1 829 m), weight 92 9 kg (204 lb 12 8 oz)  ,There is no height or weight on file to calculate BMI  Physical Exam   Constitutional: He is oriented to person, place, and time  He appears well-developed and well-nourished  HENT:   Head: Normocephalic and atraumatic  Eyes: Pupils are equal, round, and reactive to light   EOM are normal    Neck:   Cervical collar in place  NTTP cervical spine, but tender bilateral paraspinal muscles   Cardiovascular: Normal rate  Pulmonary/Chest: Effort normal  No respiratory distress  Abdominal: Soft  Musculoskeletal: Normal range of motion  Neurological: He is alert and oriented to person, place, and time  He has normal strength  Gait normal    Reflex Scores:       Tricep reflexes are 2+ on the right side and 2+ on the left side  Bicep reflexes are 2+ on the right side and 2+ on the left side  Brachioradialis reflexes are 2+ on the right side and 2+ on the left side  Patellar reflexes are 2+ on the right side and 2+ on the left side  Achilles reflexes are 2+ on the right side and 2+ on the left side  Skin: Skin is warm and dry  Psychiatric: He has a normal mood and affect  His speech is normal and behavior is normal  Judgment and thought content normal    Nursing note and vitals reviewed  Neurologic Exam     Mental Status   Oriented to person, place, and time  Follows 2 step commands  Attention: normal  Concentration: normal    Speech: speech is normal   Level of consciousness: alert  Knowledge: good  Able to repeat  Normal comprehension  Cranial Nerves   Cranial nerves II through XII intact  CN III, IV, VI   Pupils are equal, round, and reactive to light  Extraocular motions are normal      Motor Exam   Muscle bulk: normal  Overall muscle tone: normal    Strength   Strength 5/5 throughout       Sensory Exam   Light touch normal      Gait, Coordination, and Reflexes     Gait  Gait: normal    Tremor   Resting tremor: absent    Reflexes   Right brachioradialis: 2+  Left brachioradialis: 2+  Right biceps: 2+  Left biceps: 2+  Right triceps: 2+  Left triceps: 2+  Right patellar: 2+  Left patellar: 2+  Right achilles: 2+  Left achilles: 2+  Right : 2+  Left : 2+  Right Laguerre: absent  Left Laguerre: absent  Right ankle clonus: absent  Left ankle clonus: absent        MEDICAL DECISION MAKING    Imaging Studies:     Xr Spine Cervical 2 Or 3 Vw Injury  Result Date: 10/15/2019  Impression: Stable anterolisthesis C6 on C7  Secondary to known facet fracture  Workstation performed: [id number]       I have personally reviewed pertinent reports     and I have personally reviewed pertinent films in PACS

## 2019-10-28 ENCOUNTER — APPOINTMENT (OUTPATIENT)
Dept: URGENT CARE | Age: 44
End: 2019-10-28

## 2019-11-08 ENCOUNTER — TELEPHONE (OUTPATIENT)
Dept: NEUROSURGERY | Facility: CLINIC | Age: 44
End: 2019-11-08

## 2019-11-21 ENCOUNTER — TELEPHONE (OUTPATIENT)
Dept: NEUROSURGERY | Facility: CLINIC | Age: 44
End: 2019-11-21

## 2019-11-22 ENCOUNTER — HOSPITAL ENCOUNTER (OUTPATIENT)
Dept: RADIOLOGY | Facility: HOSPITAL | Age: 44
Discharge: HOME | End: 2019-11-22
Payer: OTHER MISCELLANEOUS

## 2019-11-22 DIAGNOSIS — S12.500D CLOSED TRAUMATIC MINIMALLY DISPLACED FRACTURE OF SIXTH CERVICAL VERTEBRA WITH ROUTINE HEALING, SUBSEQUENT ENCOUNTER: ICD-10-CM

## 2019-11-22 PROCEDURE — 72050 X-RAY EXAM NECK SPINE 4/5VWS: CPT

## 2019-11-25 ENCOUNTER — OFFICE VISIT (OUTPATIENT)
Dept: NEUROSURGERY | Facility: CLINIC | Age: 44
End: 2019-11-25
Payer: OTHER MISCELLANEOUS

## 2019-11-25 VITALS
TEMPERATURE: 98.3 F | HEIGHT: 72 IN | SYSTOLIC BLOOD PRESSURE: 122 MMHG | WEIGHT: 205 LBS | BODY MASS INDEX: 27.77 KG/M2 | RESPIRATION RATE: 16 BRPM | HEART RATE: 99 BPM | DIASTOLIC BLOOD PRESSURE: 86 MMHG

## 2019-11-25 DIAGNOSIS — S12.500D CLOSED TRAUMATIC MINIMALLY DISPLACED FRACTURE OF SIXTH CERVICAL VERTEBRA WITH ROUTINE HEALING, SUBSEQUENT ENCOUNTER: Primary | ICD-10-CM

## 2019-11-25 PROCEDURE — 99213 OFFICE O/P EST LOW 20 MIN: CPT | Performed by: PHYSICIAN ASSISTANT

## 2019-11-25 NOTE — PROGRESS NOTES
Patient ID: Nisha Reynoso is a 45 y o  male  Diagnoses and all orders for this visit:    Closed traumatic minimally displaced fracture of sixth cervical vertebra with routine healing, subsequent encounter  -     XR spine cervical 2 or 3 vw injury; Future          Assessment/Plan:    Very pleasant 51-year-old male, returns for 4 week follow-up, with flexion-extension films of the cervical spine  He does continue with cervical collar at all times as directed, and he arrives with the collar in place appropriately applied today  He principally complain of bilateral shoulder pain at this point and has seen pain management who suggested trigger point injections he has not proceeded with these to date  I did advise him that if his shoulder pain is significantly bothersome this was a reasonable option  He reports his pain is typically a 3 on a 1-10 scale and describes this as "achy" in character, he principally points to the sides of his neck equally as the greatest area of discomfort  He denies gait or balance disturbance, motor or sensory difficulties in the upper or lower extremity, he denies arm pain regardless of activity  He denies bowel or bladder incontinence  He has had flexion-extension films of the cervical spine 11/22/19 these were carefully reviewed in detail by Dr Ishan Sargent and compared with prior studies, 10/14/19, 9/11/19, as well as CT cervical spine 9/1/19 and do not reveal any instability with range of motion  The C6-C7 grade 1 anterolisthesis remains stable/unchanged over this interval   The C6 fracture is not clearly visible on this study  He continues to take methocarbamol on a regular basis which he finds helpful as well as Tylenol, p r n  He has gabapentin which he takes 300 mg once a day I did discuss consider discontinuing this as he does not have anoop radicular pain      On examination today he has reasonably good flexion-extension of the cervical spine without evidence of increased discomfort  Lateral flexion is also intact as well as rotation  Motor exam of the upper extremities was 5 x 5 for power, reflexes were intact and symmetric, sensation is also grossly intact  There is no pain with palpation or percussion over the posterior cervical spine  At this juncture he understands to start weaning from his cervical collar  Specifically he will not need a collar when he is in bed at night time  He understands to start the day without the collar, and after approximately 2 hours return to the collar for 2-3 hours and continue to alternate in this fashion, gradually increasing the time without the collar over the next 10 days  He is to continue with restrictions, specifically avoid lifting, pushing, and pulling greater than 10 lb  Further follow-up with Neurosurgery is planned in approximately 2 weeks with repeat flexion-extension films of the cervical spine prior to visit  He understands to continue to follow with [facility], Dr Javier Tomlin, per their protocols  In addition he is advised to continue to follow with pain management Dr Natty Hsu also per his protocols  At follow-up neurosurgical visit, provided he has tolerated wean from the cervical collar, a course of physical therapy will be advised  And anticipated release to [facility] for further disposition  These findings, impressions  and recommendations are reviewed in great detail with the patient, he expressed understanding and agreement, his questions were answered completely and to his satisfaction  Return in about 2 weeks (around 12/9/2019) for Review flexion-extension films of the cervical spine      Chief Complaint  Follow-up cervical fracture, neck pain    HPI       The following portions of the patient's history were reviewed and updated as appropriate: allergies, current medications, past family history, past medical history, past social history and past surgical history  Review of Systems   Constitutional: Negative  HENT: Negative  Eyes: Negative  Respiratory: Negative  Cardiovascular: Negative  Gastrointestinal: Negative  Endocrine: Negative  Genitourinary: Negative  Musculoskeletal: Positive for neck pain  Allergic/Immunologic: Negative  Neurological: Positive for headaches  Hematological: Negative  Psychiatric/Behavioral: Negative  All other systems reviewed and are negative  Objective:    Physical Exam   Constitutional: He is oriented to person, place, and time  He appears well-developed and well-nourished  HENT:   Head: Normocephalic and atraumatic  Eyes: Pupils are equal, round, and reactive to light  EOM are normal    Cardiovascular: Normal rate  Pulmonary/Chest: Effort normal and breath sounds normal    Neurological: He is alert and oriented to person, place, and time  Skin: Skin is warm and dry  Psychiatric: He has a normal mood and affect  Vitals reviewed  Neurologic Exam     Mental Status   Oriented to person, place, and time  Cranial Nerves     CN III, IV, VI   Pupils are equal, round, and reactive to light  Extraocular motions are normal         CERVICAL SPINE   11/22/19     INDICATION:   S12 500D: Unspecified displaced fracture of sixth cervical vertebra, subsequent encounter for fracture with routine healing      COMPARISON:  Cervical radiographs 10/14/2019     VIEWS:  XR SPINE CERVICAL COMPLETE 4 OR 5 VW NON INJURY         FINDINGS:     No evidence of fracture or subluxation  Known C6 facet fracture not well visualized on the current study      Grade 1 anterolisthesis C6 on C7 unchanged      No evidence of dynamic instability seen on flexion or extension      The intervertebral disc spaces are preserved      The prevertebral soft tissues are within normal limits      IMPRESSION:     Minimal grade 1 anterolisthesis C6 on C7 unchanged  No evidence of instability with flexion or extension

## 2019-11-25 NOTE — PATIENT INSTRUCTIONS
Start to wean from the cervical collar, as discussed  Specifically you will not need the collar in bed at night, you may start this tonight  Start the day without the collar, after approximately 2 hours return to the collar for 2-3 hours, then 2 hours without the collar,  and continue to alternate in this fashion gradually increasing the time without the collar over the next 10 days  Further follow-up with Neurosurgery is planned in approximately 2 weeks Dr Venegas Friendly [facility])  Plain x-ray films of the cervical spine with flexion-extension views a day or 2 prior to follow-up visit  Continue with restrictions, specifically avoid lifting, pushing, pulling greater than 10 lb  Should you experience new neck pain or new arm pain or pain radiating to your arms return to the collar and contact Neurosurgery sooner  Keep regularly scheduled follow-up visits with Amanda Aguirre, per their protocol  Continue to follow with pain management, for trigger point injections if necessary  Continue with Robaxin (methocarbamol) muscle relaxant as needed  Okay to discontinue gabapentin, discuss this with your pain management doctor, Dr Natty Hsu

## 2019-11-25 NOTE — LETTER
November 25, 2019     Jayleen [address]andler [address] [address]    Patient: Darío Aguirre   YOB: 1975   Date of Visit: 11/25/2019       Dear Dr Sulaiman Sanchez: Thank you for referring Bari Odonnell to me for evaluation  Below are my notes for this consultation  If you have questions, please do not hesitate to call me  I look forward to following your patient along with you  Sincerely,        Lilyclaudio Calderon PA-C        CC: MD Houston Fairchild, DO   Occu Med, Attn: Diana FULLER  [facility]  11/25/2019 10:53 AM  Sign at close encounter  Patient ID: Darío Aguirre is a 45 y o  male  Diagnoses and all orders for this visit:    Closed traumatic minimally displaced fracture of sixth cervical vertebra with routine healing, subsequent encounter  -     XR spine cervical 2 or 3 vw injury; Future          Assessment/Plan:    Very pleasant 28-year-old male, returns for 4 week follow-up, with flexion-extension films of the cervical spine  He does continue with cervical collar at all times as directed, and he arrives with the collar in place appropriately applied today  He principally complain of bilateral shoulder pain at this point and has seen pain management who suggested trigger point injections he has not proceeded with these to date  I did advise him that if his shoulder pain is significantly bothersome this was a reasonable option  He reports his pain is typically a 3 on a 1-10 scale and describes this as "achy" in character, he principally points to the sides of his neck equally as the greatest area of discomfort  He denies gait or balance disturbance, motor or sensory difficulties in the upper or lower extremity, he denies arm pain regardless of activity  He denies bowel or bladder incontinence      He has had flexion-extension films of the cervical spine 11/22/19 these were carefully reviewed in detail by Dr Emelia Stevenson and compared with prior studies, 10/14/19, 9/11/19, as well as CT cervical spine 9/1/19 and do not reveal any instability with range of motion  The C6-C7 grade 1 anterolisthesis remains stable/unchanged over this interval   The C6 fracture is not clearly visible on this study  He continues to take methocarbamol on a regular basis which he finds helpful as well as Tylenol, p r n  He has gabapentin which he takes 300 mg once a day I did discuss consider discontinuing this as he does not have anoop radicular pain  On examination today he has reasonably good flexion-extension of the cervical spine without evidence of increased discomfort  Lateral flexion is also intact as well as rotation  Motor exam of the upper extremities was 5 x 5 for power, reflexes were intact and symmetric, sensation is also grossly intact  There is no pain with palpation or percussion over the posterior cervical spine  At this juncture he understands to start weaning from his cervical collar  Specifically he will not need a collar when he is in bed at night time  He understands to start the day without the collar, and after approximately 2 hours return to the collar for 2-3 hours and continue to alternate in this fashion, gradually increasing the time without the collar over the next 10 days  He is to continue with restrictions, specifically avoid lifting, pushing, and pulling greater than 10 lb  Further follow-up with Neurosurgery is planned in approximately 2 weeks with repeat flexion-extension films of the cervical spine prior to visit  He understands to continue to follow with [facility], Dr Tarik Garcia, per their protocols  In addition he is advised to continue to follow with pain management Dr Angie Larkin also per his protocols  At follow-up neurosurgical visit, provided he has tolerated wean from the cervical collar, a course of physical therapy will be advised    And anticipated release to [facility] for further disposition  These findings, impressions  and recommendations are reviewed in great detail with the patient, he expressed understanding and agreement, his questions were answered completely and to his satisfaction  Return in about 2 weeks (around 12/9/2019) for Review flexion-extension films of the cervical spine  Chief Complaint  Follow-up cervical fracture, neck pain    HPI       The following portions of the patient's history were reviewed and updated as appropriate: allergies, current medications, past family history, past medical history, past social history and past surgical history  Review of Systems   Constitutional: Negative  HENT: Negative  Eyes: Negative  Respiratory: Negative  Cardiovascular: Negative  Gastrointestinal: Negative  Endocrine: Negative  Genitourinary: Negative  Musculoskeletal: Positive for neck pain  Allergic/Immunologic: Negative  Neurological: Positive for headaches  Hematological: Negative  Psychiatric/Behavioral: Negative  All other systems reviewed and are negative  Objective:    Physical Exam   Constitutional: He is oriented to person, place, and time  He appears well-developed and well-nourished  HENT:   Head: Normocephalic and atraumatic  Eyes: Pupils are equal, round, and reactive to light  EOM are normal    Cardiovascular: Normal rate  Pulmonary/Chest: Effort normal and breath sounds normal    Neurological: He is alert and oriented to person, place, and time  Skin: Skin is warm and dry  Psychiatric: He has a normal mood and affect  Vitals reviewed  Neurologic Exam     Mental Status   Oriented to person, place, and time  Cranial Nerves     CN III, IV, VI   Pupils are equal, round, and reactive to light    Extraocular motions are normal         CERVICAL SPINE   11/22/19     INDICATION:   S12 500D: Unspecified displaced fracture of sixth cervical vertebra, subsequent encounter for fracture with routine healing      COMPARISON:  Cervical radiographs 10/14/2019     VIEWS:  XR SPINE CERVICAL COMPLETE 4 OR 5 VW NON INJURY         FINDINGS:     No evidence of fracture or subluxation  Known C6 facet fracture not well visualized on the current study      Grade 1 anterolisthesis C6 on C7 unchanged      No evidence of dynamic instability seen on flexion or extension      The intervertebral disc spaces are preserved      The prevertebral soft tissues are within normal limits      IMPRESSION:     Minimal grade 1 anterolisthesis C6 on C7 unchanged  No evidence of instability with flexion or extension

## 2019-12-06 ENCOUNTER — HOSPITAL ENCOUNTER (OUTPATIENT)
Dept: RADIOLOGY | Facility: HOSPITAL | Age: 44
Discharge: HOME | End: 2019-12-06
Payer: OTHER MISCELLANEOUS

## 2019-12-06 DIAGNOSIS — S12.500D CLOSED TRAUMATIC MINIMALLY DISPLACED FRACTURE OF SIXTH CERVICAL VERTEBRA WITH ROUTINE HEALING, SUBSEQUENT ENCOUNTER: ICD-10-CM

## 2019-12-06 PROCEDURE — 72040 X-RAY EXAM NECK SPINE 2-3 VW: CPT

## 2019-12-09 ENCOUNTER — OFFICE VISIT (OUTPATIENT)
Dept: NEUROSURGERY | Facility: CLINIC | Age: 44
End: 2019-12-09
Payer: OTHER MISCELLANEOUS

## 2019-12-09 VITALS
WEIGHT: 205 LBS | HEIGHT: 72 IN | RESPIRATION RATE: 16 BRPM | HEART RATE: 90 BPM | SYSTOLIC BLOOD PRESSURE: 122 MMHG | TEMPERATURE: 98.4 F | DIASTOLIC BLOOD PRESSURE: 72 MMHG | BODY MASS INDEX: 27.77 KG/M2

## 2019-12-09 DIAGNOSIS — S12.500D CLOSED TRAUMATIC MINIMALLY DISPLACED FRACTURE OF SIXTH CERVICAL VERTEBRA WITH ROUTINE HEALING, SUBSEQUENT ENCOUNTER: Primary | ICD-10-CM

## 2019-12-09 PROCEDURE — 99213 OFFICE O/P EST LOW 20 MIN: CPT | Performed by: PHYSICIAN ASSISTANT

## 2019-12-09 NOTE — PROGRESS NOTES
Patient ID: Rach Moore is a 45 y o  male  Diagnoses and all orders for this visit:    Closed traumatic minimally displaced fracture of sixth cervical vertebra with routine healing, subsequent encounter  -     XR spine cervical 2 or 3 vw injury; Future          Assessment/Plan:  Very pleasant 62-year-old male, accompanied by his wife, returns for 2 week follow-up  He has wean from the cervical collar, as directed, he is not wearing it bedtime, and yesterday was his 1st full day without the collar  He continues to complain of some bilateral shoulder pain he points to the left greater than right, reports that it is typically a 3 on a 1-10 scale  He denies any arm symptoms, numbness or tingling, decreased strength and he denies any anoop midline neck pain  He does report his cervical range of motion is incomplete secondary to tightness/stiffness, but not associated with pain  He has had flexion-extension films as requested 12/6/19 the studies were carefully reviewed in detail by Dr Ramos Estrella, once again the C6-C7 anterolisthesis is identified, flexion-extension films failed to reveal instability with range of motion  Fracture is not clearly visible on the study  Once again the CT was reviewed and the left C6 facet fracture was reviewed and is somewhat comminuted in nature  On examination today; he is awake, alert and oriented x3, motor examination the upper extremities is 5 x 5 for power, reflexes are intact and symmetric for the triceps, biceps and brachioradialis, Zac was negative, sensation sharp dull and vibratory were intact throughout  There is no pain with palpation or percussion over the posterior cervical spine  There was some diffuse spasm palpable in the triceps bilaterally slightly greater on the left than the right  Cervical range of motion was slightly restricted secondary to lack of use but with no evidence of pain with range of motion      At this juncture further follow-up is planned in approximately 1 month with repeat flexion-extension films, he understands continue without the collar  He understands he may gradually increase his range of motion with gentle stretching and range-of-motion exercises  He also understands he is to gradually increase his activities specifically his weight restrictions by 5 lb every 2 weeks until he reaches typical maximum for his age  He is to continue off work status  Further follow-up with Neurosurgery in approximately 1 month, films prior to visit have been ordered  These findings, impressions and recommendations are reviewed in great detail with the patient and  family, they expressed understanding and agreement, their questions were answered completely and to their satisfaction  Return in about 4 weeks (around 1/6/2020) for Review flexion-extension cervical spine  Chief Complaint  Follow-up cervical spine studies, neck/shoulder discomfort  HPI       The following portions of the patient's history were reviewed and updated as appropriate: allergies, current medications, past family history, past medical history, past social history and past surgical history  Review of Systems   Constitutional: Negative  HENT: Negative  Eyes: Negative  Respiratory: Negative  Cardiovascular: Negative  Gastrointestinal: Negative  Endocrine: Negative  Genitourinary: Negative  Musculoskeletal: Positive for neck pain  Allergic/Immunologic: Negative  Neurological: Positive for headaches  Hematological: Negative  Psychiatric/Behavioral: Negative  All other systems reviewed and are negative  Objective:    Physical Exam   Constitutional: He is oriented to person, place, and time  He appears well-developed and well-nourished  HENT:   Head: Normocephalic and atraumatic  Eyes: Pupils are equal, round, and reactive to light  EOM are normal    Cardiovascular: Normal rate     Pulmonary/Chest: Effort normal and breath sounds normal    Neurological: He is alert and oriented to person, place, and time  Skin: Skin is warm and dry  Psychiatric: He has a normal mood and affect  Vitals reviewed  Neurologic Exam     Mental Status   Oriented to person, place, and time  Cranial Nerves     CN III, IV, VI   Pupils are equal, round, and reactive to light  Extraocular motions are normal           CERVICAL SPINE   12/6/19     INDICATION:   S12 500D: Unspecified displaced fracture of sixth cervical vertebra, subsequent encounter for fracture with routine healing      COMPARISON:  November 22, 2019     VIEWS:  XR SPINE CERVICAL 2 OR 3 VW INJURY   Images: 4 (frontal radiograph, and lateral radiographs in flexion, neutral position, and extension)      FINDINGS: Bone mineralization within normal limits      The known C6 facet fracture is again not well seen but appears unchanged from previous      C7 is incompletely imaged on this exam, partially obscured by the shoulders on the lateral views      There is again slight concave left scoliotic curvature suggesting muscular spasm       There continues to be grade 1 anterolisthesis of C6 on C7 in neutral position, unchanged from prior exam   C6-C7 is poorly seen on the flexion image, it does not appear to change with flexion or extension to the limits of visualization      The prevertebral soft tissues are within normal limits        Visualized lung apices are clear         IMPRESSION:  No change from prior exam   Stable Grade 1 anterolisthesis of C6 on C7  No instability is seen

## 2019-12-09 NOTE — LETTER
December 9, 2019     Bella Sherman Sana Clark [address] [address]    Patient: Kavita Curiel   YOB: 1975   Date of Visit: 12/9/2019       Dear Dr Sana Clark: Thank you for referring Cecile Chavez to me for evaluation  Below are my notes for this consultation  If you have questions, please do not hesitate to call me  I look forward to following your patient along with you  Sincerely,        Darcie Jackson MD        CC: No Recipients  [facility]  12/9/2019  3:00 PM  Sign at close encounter  Patient ID: Kavita Curiel is a 45 y o  male  Diagnoses and all orders for this visit:    Closed traumatic minimally displaced fracture of sixth cervical vertebra with routine healing, subsequent encounter  -     XR spine cervical 2 or 3 vw injury; Future          Assessment/Plan:  Very pleasant 28-year-old male, accompanied by his wife, returns for 2 week follow-up  He has wean from the cervical collar, as directed, he is not wearing it bedtime, and yesterday was his 1st full day without the collar  He continues to complain of some bilateral shoulder pain he points to the left greater than right, reports that it is typically a 3 on a 1-10 scale  He denies any arm symptoms, numbness or tingling, decreased strength and he denies any anoop midline neck pain  He does report his cervical range of motion is incomplete secondary to tightness/stiffness, but not associated with pain  He has had flexion-extension films as requested 12/6/19 the studies were carefully reviewed in detail by Dr Gatito Plasencia, once again the C6-C7 anterolisthesis is identified, flexion-extension films failed to reveal instability with range of motion  Fracture is not clearly visible on the study  Once again the CT was reviewed and the left C6 facet fracture was reviewed and is somewhat comminuted in nature      On examination today; he is awake, alert and oriented x3, motor examination the upper extremities is 5 x 5 for power, reflexes are intact and symmetric for the triceps, biceps and brachioradialis, Zac was negative, sensation sharp dull and vibratory were intact throughout  There is no pain with palpation or percussion over the posterior cervical spine  There was some diffuse spasm palpable in the triceps bilaterally slightly greater on the left than the right  Cervical range of motion was slightly restricted secondary to lack of use but with no evidence of pain with range of motion  At this juncture further follow-up is planned in approximately 1 month with repeat flexion-extension films, he understands continue without the collar  He understands he may gradually increase his range of motion with gentle stretching and range-of-motion exercises  He also understands he is to gradually increase his activities specifically his weight restrictions by 5 lb every 2 weeks until he reaches typical maximum for his age  He is to continue off work status  Further follow-up with Neurosurgery in approximately 1 month, films prior to visit have been ordered  These findings, impressions and recommendations are reviewed in great detail with the patient and  family, they expressed understanding and agreement, their questions were answered completely and to their satisfaction  Return in about 4 weeks (around 1/6/2020) for Review flexion-extension cervical spine  Chief Complaint  Follow-up cervical spine studies, neck/shoulder discomfort  HPI       The following portions of the patient's history were reviewed and updated as appropriate: allergies, current medications, past family history, past medical history, past social history and past surgical history  Review of Systems   Constitutional: Negative  HENT: Negative  Eyes: Negative  Respiratory: Negative  Cardiovascular: Negative  Gastrointestinal: Negative  Endocrine: Negative  Genitourinary: Negative  Musculoskeletal: Positive for neck pain  Allergic/Immunologic: Negative  Neurological: Positive for headaches  Hematological: Negative  Psychiatric/Behavioral: Negative  All other systems reviewed and are negative  Objective:    Physical Exam   Constitutional: He is oriented to person, place, and time  He appears well-developed and well-nourished  HENT:   Head: Normocephalic and atraumatic  Eyes: Pupils are equal, round, and reactive to light  EOM are normal    Cardiovascular: Normal rate  Pulmonary/Chest: Effort normal and breath sounds normal    Neurological: He is alert and oriented to person, place, and time  Skin: Skin is warm and dry  Psychiatric: He has a normal mood and affect  Vitals reviewed  Neurologic Exam     Mental Status   Oriented to person, place, and time  Cranial Nerves     CN III, IV, VI   Pupils are equal, round, and reactive to light  Extraocular motions are normal           CERVICAL SPINE   12/6/19     INDICATION:   S12 500D: Unspecified displaced fracture of sixth cervical vertebra, subsequent encounter for fracture with routine healing      COMPARISON:  November 22, 2019     VIEWS:  XR SPINE CERVICAL 2 OR 3 VW INJURY   Images: 4 (frontal radiograph, and lateral radiographs in flexion, neutral position, and extension)      FINDINGS: Bone mineralization within normal limits      The known C6 facet fracture is again not well seen but appears unchanged from previous      C7 is incompletely imaged on this exam, partially obscured by the shoulders on the lateral views      There is again slight concave left scoliotic curvature suggesting muscular spasm       There continues to be grade 1 anterolisthesis of C6 on C7 in neutral position, unchanged from prior exam   C6-C7 is poorly seen on the flexion image, it does not appear to change with flexion or extension to the limits of visualization      The prevertebral soft tissues are within normal limits     Visualized lung apices are clear         IMPRESSION:  No change from prior exam   Stable Grade 1 anterolisthesis of C6 on C7  No instability is seen

## 2019-12-09 NOTE — PATIENT INSTRUCTIONS
Gradually increase your activity as tolerated  Gentle range of motion and stretching of the neck without discomfort  Continue without the collar  Follow-up in approximately 4 weeks (1 month) Dr Alicea/Joanne([facility])    Flexion-extension films of the cervical spine a few days prior to follow-up with

## 2019-12-16 ENCOUNTER — APPOINTMENT (OUTPATIENT)
Dept: URGENT CARE | Age: 44
End: 2019-12-16
Payer: OTHER MISCELLANEOUS

## 2019-12-16 PROCEDURE — 99213 OFFICE O/P EST LOW 20 MIN: CPT | Performed by: PREVENTIVE MEDICINE

## 2020-01-10 ENCOUNTER — HOSPITAL ENCOUNTER (OUTPATIENT)
Dept: RADIOLOGY | Facility: HOSPITAL | Age: 45
Discharge: HOME | End: 2020-01-10
Payer: OTHER MISCELLANEOUS

## 2020-01-10 DIAGNOSIS — S12.500D CLOSED TRAUMATIC MINIMALLY DISPLACED FRACTURE OF SIXTH CERVICAL VERTEBRA WITH ROUTINE HEALING, SUBSEQUENT ENCOUNTER: ICD-10-CM

## 2020-01-10 PROCEDURE — 72040 X-RAY EXAM NECK SPINE 2-3 VW: CPT

## 2020-01-13 ENCOUNTER — OFFICE VISIT (OUTPATIENT)
Dept: NEUROSURGERY | Facility: CLINIC | Age: 45
End: 2020-01-13
Payer: OTHER MISCELLANEOUS

## 2020-01-13 VITALS
WEIGHT: 205 LBS | BODY MASS INDEX: 27.77 KG/M2 | HEART RATE: 88 BPM | TEMPERATURE: 98.2 F | HEIGHT: 72 IN | SYSTOLIC BLOOD PRESSURE: 104 MMHG | DIASTOLIC BLOOD PRESSURE: 72 MMHG | RESPIRATION RATE: 16 BRPM

## 2020-01-13 DIAGNOSIS — S12.500D CLOSED TRAUMATIC MINIMALLY DISPLACED FRACTURE OF SIXTH CERVICAL VERTEBRA WITH ROUTINE HEALING, SUBSEQUENT ENCOUNTER: Primary | ICD-10-CM

## 2020-01-13 DIAGNOSIS — S12.500A: ICD-10-CM

## 2020-01-13 PROCEDURE — 99213 OFFICE O/P EST LOW 20 MIN: CPT | Performed by: PHYSICIAN ASSISTANT

## 2020-01-13 RX ORDER — METHOCARBAMOL 750 MG/1
750 TABLET, FILM COATED ORAL 2 TIMES DAILY PRN
Qty: 60 TABLET | Refills: 0 | Status: SHIPPED | OUTPATIENT
Start: 2020-01-13 | End: 2020-10-15

## 2020-01-13 NOTE — PROGRESS NOTES
Neurosurgery Office Note  Murtaza Hood 45 y o  male MRN: [id number]      Assessment/Plan     Traumatic closed fracture of C6 vertebra with minimal displacement ([address] )  Left C6 facet fracture with associated grade 1 anterolisthesis of C6 on C7 s/p head injury on 9/2/19  · No instability on recent flexion extension xrays  · Neck pain improved; no longer wearing collar  · No radicular or myelopathic symptoms    Imaging:  · Xray cervical spine, 1/10/20: Stable grade 1 anterolisthesis C6 on C7 without evidence of instability    Plan:  · Anterolisthesis remains stable with flexion and extension after not wearing cervical collar for several weeks  · Patient is cleared to begin physical therapy with no restrictions  · Return as needed; may resume all activities       Diagnoses and all orders for this visit:    Closed traumatic minimally displaced fracture of sixth cervical vertebra with routine healing, subsequent encounter    Closed traumatic minimally displaced fracture of sixth cervical vertebra, initial encounter ([facility])  -     methocarbamol (ROBAXIN) 750 mg tablet; Take 1 tablet (750 mg total) by mouth 2 (two) times a day as needed for muscle spasms            CHIEF COMPLAINT    Chief Complaint   Patient presents with    Follow-up       HISTORY    This is a 39yo m dayron who sustained a neck injury at work on 9/2/19 when a wood pallet fell onto his head  Imaging at that time showed a left C6 facet fracture with a grade 1 anterolisthesis of C6 on C7  There were no signs of cord injury  He has been out of his collar for several weeks now  He is c/o soreness in his paraspinal muscles and occasional dizziness  He is getting over a 2 week long URI/influenza  He is ready to begin physical therapy, which he is cleared to do without restrictions  A refill of robaxin was given to the patient today  He is no longer taking gabapentin  REVIEW OF SYSTEMS    Review of Systems   Constitutional: Negative  HENT: Negative  Eyes: Negative  Respiratory: Negative  Cardiovascular: Negative  Gastrointestinal: Negative  Endocrine: Negative  Genitourinary: Negative  Musculoskeletal: Positive for neck pain (2/10 more to the left   ) and neck stiffness (improving )  Allergic/Immunologic: Negative  Neurological: Positive for headaches (frontal HA, pain with pressure)  Hematological: Negative  Psychiatric/Behavioral: Negative  All other systems reviewed and are negative  Meds/Allergies     Current Outpatient Medications   Medication Sig Dispense Refill    acetaminophen (TYLENOL) 325 mg tablet Take 3 tablets (975 mg total) by mouth every 8 (eight) hours (Patient taking differently: Take 975 mg by mouth as needed ) 30 tablet 0    methocarbamol (ROBAXIN) 750 mg tablet Take 1 tablet (750 mg total) by mouth 2 (two) times a day as needed for muscle spasms 60 tablet 0    gabapentin (NEURONTIN) 300 mg capsule Take 1 capsule (300 mg total) by mouth daily at bedtime (Patient not taking: Reported on 1/13/2020) 30 capsule 1     No current facility-administered medications for this visit  No Known Allergies    PAST HISTORY    Past Medical History:   Diagnosis Date    Asthma        Past Surgical History:   Procedure Laterality Date    CHOLECYSTECTOMY      GALLBLADDER SURGERY N/A 2010       Social History     Tobacco Use    Smoking status: Never Smoker    Smokeless tobacco: Never Used   Substance Use Topics    Alcohol use: Not Currently    Drug use: Not Currently       Family History   Problem Relation Age of Onset    No Known Problems Mother     No Known Problems Father          Above history personally reviewed  EXAM    Vitals:Blood pressure 104/72, pulse 88, temperature 98 2 °F (36 8 °C), temperature source Tympanic, resp  rate 16, height 6' (1 829 m), weight 93 kg (205 lb)  ,Body mass index is 27 8 kg/m²  Physical Exam   Constitutional: He is oriented to person, place, and time   He appears well-developed and well-nourished  HENT:   Head: Normocephalic and atraumatic  Eyes: Pupils are equal, round, and reactive to light  EOM are normal    Neck:   Decreased ROM cervical spine with all movement   Cardiovascular: Normal rate  Pulmonary/Chest: Effort normal  No respiratory distress  Abdominal: Soft  Musculoskeletal: Normal range of motion  Neurological: He is alert and oriented to person, place, and time  He has normal strength  He has a normal Finger-Nose-Finger Test  Gait normal    Reflex Scores:       Tricep reflexes are 2+ on the right side and 2+ on the left side  Bicep reflexes are 2+ on the right side and 2+ on the left side  Brachioradialis reflexes are 2+ on the right side and 2+ on the left side  Patellar reflexes are 2+ on the right side and 2+ on the left side  Achilles reflexes are 2+ on the right side and 2+ on the left side  Skin: Skin is warm and dry  Psychiatric: He has a normal mood and affect  His speech is normal and behavior is normal  Judgment and thought content normal    Nursing note and vitals reviewed  Neurologic Exam     Mental Status   Oriented to person, place, and time  Recall at 5 minutes: recalls 3 of 3 objects  Follows 2 step commands  Attention: normal  Concentration: normal    Speech: speech is normal   Level of consciousness: alert  Knowledge: good  Able to perform simple calculations  Able to name object  Able to repeat  Normal comprehension  Cranial Nerves   Cranial nerves II through XII intact  CN III, IV, VI   Pupils are equal, round, and reactive to light  Extraocular motions are normal      Motor Exam   Muscle bulk: normal  Overall muscle tone: normal  Right arm pronator drift: absent  Left arm pronator drift: absent    Strength   Strength 5/5 throughout       Sensory Exam   Light touch normal    Pinprick normal    DST and JPS intact bilaterally  TTP R>L cervical paraspinal muscles     Gait, Coordination, and Reflexes     Gait  Gait: normal    Coordination   Finger to nose coordination: normal    Tremor   Resting tremor: absent    Reflexes   Right brachioradialis: 2+  Left brachioradialis: 2+  Right biceps: 2+  Left biceps: 2+  Right triceps: 2+  Left triceps: 2+  Right patellar: 2+  Left patellar: 2+  Right achilles: 2+  Left achilles: 2+  Right : 2+  Left : 2+  Right Laguerre: absent  Left Laguerre: absent  Right ankle clonus: absent  Left ankle clonus: absent        MEDICAL DECISION MAKING    Imaging Studies:     Xr Spine Cervical 2 Or 3 Vw Injury    Result Date: 1/12/2020  Narrative: CERVICAL SPINE INDICATION:   S12 500D: Unspecified displaced fracture of sixth cervical vertebra, subsequent encounter for fracture with routine healing  COMPARISON:  Cervical spine radiographs 12/6/2019 VIEWS:  XR SPINE CERVICAL 2 OR 3 VW INJURY FINDINGS: Asymmetry of the left C6 facet attributed to known posttraumatic deformity  No acute fracture or dislocation  Stable grade 1 anterolisthesis C6 on C7  No evidence of dynamic instability seen on flexion or extension  The intervertebral disc spaces are preserved  The prevertebral soft tissues are within normal limits  Impression: Stable grade 1 anterolisthesis C6 on C7 without evidence of instability  Workstation performed: [id number]       I have personally reviewed pertinent reports     and I have personally reviewed pertinent films in PACS

## 2020-01-13 NOTE — ASSESSMENT & PLAN NOTE
Left C6 facet fracture with associated grade 1 anterolisthesis of C6 on C7 s/p head injury on 9/2/19  · No instability on recent flexion extension xrays  · Neck pain improved; no longer wearing collar  · No radicular or myelopathic symptoms    Imaging:  · Xray cervical spine, 1/10/20: Stable grade 1 anterolisthesis C6 on C7 without evidence of instability    Plan:  · Anterolisthesis remains stable with flexion and extension after not wearing cervical collar for several weeks  · Patient is cleared to begin physical therapy with no restrictions  · Return as needed; may resume all activities

## 2020-01-15 ENCOUNTER — APPOINTMENT (OUTPATIENT)
Dept: URGENT CARE | Age: 45
End: 2020-01-15
Payer: OTHER MISCELLANEOUS

## 2020-01-15 PROCEDURE — 99213 OFFICE O/P EST LOW 20 MIN: CPT | Performed by: PREVENTIVE MEDICINE

## 2020-01-29 ENCOUNTER — APPOINTMENT (OUTPATIENT)
Dept: URGENT CARE | Age: 45
End: 2020-01-29
Payer: OTHER MISCELLANEOUS

## 2020-01-29 PROCEDURE — 99283 EMERGENCY DEPT VISIT LOW MDM: CPT | Performed by: PREVENTIVE MEDICINE

## 2020-01-29 PROCEDURE — G0382 LEV 3 HOSP TYPE B ED VISIT: HCPCS | Performed by: PREVENTIVE MEDICINE

## 2020-02-05 ENCOUNTER — APPOINTMENT (OUTPATIENT)
Dept: URGENT CARE | Age: 45
End: 2020-02-05
Payer: OTHER MISCELLANEOUS

## 2020-02-05 PROCEDURE — 99213 OFFICE O/P EST LOW 20 MIN: CPT | Performed by: FAMILY MEDICINE

## 2020-02-12 ENCOUNTER — APPOINTMENT (OUTPATIENT)
Dept: URGENT CARE | Age: 45
End: 2020-02-12
Payer: OTHER MISCELLANEOUS

## 2020-02-12 PROCEDURE — 99213 OFFICE O/P EST LOW 20 MIN: CPT | Performed by: PREVENTIVE MEDICINE

## 2020-03-04 ENCOUNTER — APPOINTMENT (OUTPATIENT)
Dept: URGENT CARE | Age: 45
End: 2020-03-04
Payer: OTHER MISCELLANEOUS

## 2020-03-04 PROCEDURE — 99213 OFFICE O/P EST LOW 20 MIN: CPT | Performed by: PREVENTIVE MEDICINE

## 2020-03-18 ENCOUNTER — TELEPHONE (OUTPATIENT)
Dept: NEUROLOGY | Facility: CLINIC | Age: 45
End: 2020-03-18

## 2020-03-18 ENCOUNTER — APPOINTMENT (OUTPATIENT)
Dept: URGENT CARE | Age: 45
End: 2020-03-18
Payer: OTHER MISCELLANEOUS

## 2020-03-18 PROCEDURE — 99213 OFFICE O/P EST LOW 20 MIN: CPT | Performed by: PREVENTIVE MEDICINE

## 2020-03-18 NOTE — TELEPHONE ENCOUNTER
Best contact number for [id number]:[phone]    Emergency Contact name and number:  Elise Easton [phone]  Referring provider and telephone number:  Corie Sharma [phone]  Primary Care Provider Name and if affiliated with [facility]:   Dr London Castaneda Yes  Reason for Appointment/Dx:  Concussion  Neurology Location patient would like to be seen:Any    Order received? Yes                                                 Records Received? Yes    Have you ever seen another Neurologist?       No    Insurance Information    Insurance Name:    ID/Policy #:    Secondary Insurance:    ID/Policy#: Workman's Comp/ Accident/ School  Information      Workman's Comp/Accident/School related?        Yes, describe: work injury    If yes name of Insurance company:[employer]    Date of Injury:9/2/2019    Type of Injury:head injury     Name and Telephone Number:  Jeanie Lugo [phone]  Notes:  [id number]                 Appointment date:   3/24/2020

## 2020-03-19 NOTE — TELEPHONE ENCOUNTER
3/19/20 OPEN WC CLAIM  - pt has PWA - has not seen PCP in over 3 years, goes to Urgent Care   - unable to obtain Physicians Order  -- 3 19 20 mm

## 2020-03-23 NOTE — PROGRESS NOTES
[address] [facility] Consult   PATIENT:  Stoney Contreras  MRN:  [id number]  :  1975  DATE OF SERVICE:  3/24/2020  REFERRED BY: Franklin Camarena*  PMD: Paige Simms    Assessment:     Stoney Contreras is a delightful 45 y o  male with a past medical history that includes asthma, hypomagnesemia, tobacco abuse referred here for evaluation of posttraumatic headache  My initial evaluation 2020    Chronic post-traumatic headache, not intractable  Cervicogenic headache  On 2019, he was working pushing stacked pallets when they accidentally fell hitting him on the head and neck  He does not describe a typical constellation of concussion symptoms and we discussed this was not likely a concussion, but he certainly had other injuries including neck fracture  He was subsequently evaluated in the emergency department, admitted overnight and has been following with Neurosurgery and most recently physical therapy for his neck fracture  He has been following with worker's Comp as well and reports he has had an unremarkable MRI of the brain which I do not have the report of  He was referred for chronic posttraumatic headache   - as of 2020 he reports headaches have gradually improved although he is still having significant headaches 4 times a week that last all day with migrainous, cervicogenic and tension features  He also occasionally may feel a bit off balance when looking up or standing up quickly, likely related to the neck injury  Workup:  - patient reports normal MRI brain from 2020 obtain outside our system and I cannot officially review, he will bring in for my review next visit    He also reports MRI C-spine which I do not have the records for - he is following with Neurosurgery for this    We discussed that his headaches sound like posttraumatic headaches with migrainous features as well as components of cervicogenic and tension headaches  We discussed that I agree with physical therapy for neck and that he should continue to improve with time  We have discussed concussions and the natural course of recovery  We discussed that from history does not sound like he had a concussion  We have discussed that even if he did have a concussion, symptoms from a concussion typically take 2 weeks to resolve, and although sometimes it can feel like concussion symptoms linger on, at this point these symptoms would be related to other factors  Headache Preventive:  - Discussed headache hygiene and lifestyle factors that may improve headaches   - Discussed options for prescription preventative treatment however he would like to hold off at this time:  1st tries would likely be amitriptyline and discussed this medication in case he changes his mind  Discussed proper use, possible side effects and risks  Headache Abortive:  - Discussed not taking over-the-counter or prescription headache abortive more than 3 days per week to prevent medication overuse headache  - trial of prochlorperazine 10 mg as needed for nausea or headache with migrainous features  Discussed proper use, possible side effects and risks  Likelihood of Concussion:  Witnessed mechanism  yes   Typical Symptoms no   Onset of symptoms within 24H no   Improving Time Course yes   Is there an alternative Diagnosis yes     Typical Symptoms= Yes if:  ? 1 A symptoms: Loss of consciousness or Amnesia  ?  3 B symptoms: Confusion/Fogginess, Headache, Dizziness/Loss  of  Balance, Nausea/Vomiting, Drowsiness, Vision  Changes/Photophobia, Phonophobia, Tinnitus, Mood  change    How would you classify the concussion?   not a concussion     Patient inctructions:     I do not recommend any concussion specific therapies, does not sound like you had a concussion    Agree with PT for neck    Bring MRI Brain images next visit     Headache/migraine treatment:   Abortive medications (for immediate treatment of a headache): Ok to take ibuprofen or acetaminophen for headaches, but try to limit the amount and frequency that you are taking to avoid medication overuse/rebound headache  Ideally no more than 3 days per week  trial of prochlorperazine/Compazine which is technically a nausea medication but it can be used for headaches with and without nausea    Prescription preventive medications for headaches/migraines   (to take every day to help prevent headaches - not to take at the time of headache): Will hold off on starting for now, but if you change your mind call and I will order EKG and if this is normal then send in:  Amytriptyline start 10mg at bedtime  Increase by 10mg each week until good effect on headaches/pain or reach 50mg daily     *Typically these types of medications take time untill you see the benefit, although some may see improvement in days, often it may take weeks, especially if the medication is being titrated up to a beneficial level  Please contact us if there are any concerns or questions regarding the medication  Sleep/headache prevention:  -  Melatonin - you may take 3 mg nightly for sleep  You should take this 1 hour prior to bedtime consistently every night for it to work  It works by gradually helping to adjust your sleep time over days to weeks, rather than immediately making you feel sleepy  Lifestyle Recommendations:  - Maintain good sleep hygiene  Going to bed and waking up at consistent times, avoiding excessive daytime naps, avoiding caffeinated beverages in the evening, avoid excessive stimulation in the evening and generally using bed primarily for sleeping  One hour before bedtime would recommend turning lights down lower, decreasing your activity (may read quietly, listen to music at a low volume)  When you get into bed, should eliminate all technology (no texting, emailing, playing with your phone, iPad or tablet in bed)    - Maintain good hydration  Drink  2L of fluid a day (4 typical small water bottles)  - Maintain good nutrition  In particular don't skip meals and eat balanced meals regularly   - TOBACCO CESSATION: Jeremy Lara was educated regarding the impact of smoking and advised to quit  Willingness to quit was assessed and he is not willing at this time  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[employer], [employer]        Education and Follow-up  - Please contact us if any questions or concerns arise  Of course, try to protect yourself from head injuries, and if any new concerning symptoms or significant blow to the head or body go to the emergency department  - Follow up if needed           CC:   Jeremy Lara is a 45y o  year old  right handed male who presents for evaluation following a post trauamtic  History obtained from patient as well as available medical record review  Workers comp case  This is a Concussion Case that may be under litigation  Patient understands that we will not be writing any letters or working with  on their behalf  However, we will continue to do our best to provide the best medical care possible  History of Present Illness:   Current medical illnesses or past medical history include asthma, hypomagnesemia, tobacco abuse    Date/Specifics:   On 8/27/19, he was working as a  when he dropped a Pallet of foam pads off a laron while working  Lestine Party initially lost his balance, and it hit him twice  The palate hit him on top of his head   was wearing a hard hat  Acute symptoms included: no LOC, no amnesia, He experience a crunching sensation and severe pain in his neck  No one was around and it was traumatic  He was pinned down  Fran Barraza dismissed it and told him to take a couple minute break and get back to work  He felt like could not function because everything hurt really bad, dizzy, SOB  Worked over the next few days and walking hunched over     He went to an urgent care and had plain films that were concerning for facet fracture  9/1/20 He was evaluated in the emergency department and admitted to the Trauma service with Neurosurgery consultation overnight  He suffered traumatic closed fracture of C6 vertebra with minimal displacement    Has been following with Neurosurgery, please see EMR for details of notes from the past 6-7 months  - last visit 01/13/2020 was cleared to begin PT with no restriction, refilled Robaxin, no longer taking gabapentin  - was going to [facility] and now switching to [facility]      - as of 3/24/2020 gradual improvement in symptoms, still getting headaches and when he looks up, or gets up quickly sometimes feels off balance      Headaches  How often do the headaches occur?   - mild headaches twice a month before that would go away with tylenol   - as of 3/24/2020:  4 times a week lasting all day (before was all day every day or lasting a week straight)  What time of the day do the headaches start? Wakes up with it usually, sometimes around noon     Aura? without aura    Where is your headache located and pain quality?   - varies, mostly right temple - sharp pain  - mid occipital and shoots up the back of the head and then becomes diffuse   What is the intensity of pain? Average: 6/10, worst 10/10  Associated symptoms:   [x] Nausea       [] Vomiting        [] Diarrhea  [x] Insomnia    [x] Stiff or sore neck   [x] Problems with concentration  [x] Photophobia     [x]Phonophobia      [] Osmophobia  [x] Blurred vision   [x] Prefer quiet, dark room  [x] Light-headed or dizzy     [x] Tinnitus   [] Hands or feet tingle or feel numb/paresthesias      [] Red ear      [] Ptosis      [] Facial droop  [x] Lacrimation - both  [] Nasal congestion/rhinorrhea   [] Flushing of face  [] Change in pupil size    Things that make the headache worse? No specific movements, any movement     Headache triggers:  unknown    Have you seen someone else for headaches or pain? Yes  Have you had trigger point injection performed and how often? No  Have you had Botox injection performed and how often? No   Have you had epidural injections or transforaminal injections performed? No  Have you ever had any Brain imaging? yes - MRI Brain without contrast was normal outside our system in 2/2020    What medications do you take or have you taken for your headaches? ABORTIVE:    OTC medications have been ineffective     Methocarbamol once a day in am helps neck pain  Naproxen 550 mg  - 4-5 times a week for headaches      PREVENTIVE:   -       Past:  Gabapentin - did not like the way he felt       Alternative therapies used in the past for headaches? PT    LIFESTYLE  Sleep   - averages: 7 hours  Problems falling asleep?:   Yes  Problems staying asleep?:  Yes      Water: 4 bottles per day  Caffeine: 1-2 cups per day    Mood:   Denies history of anxiety or depression or other diagnosed mood disorder        The following portions of the patient's history were reviewed in the system and updated as appropriate: allergies, current medications, past family history, past medical history, past social history, past surgical history and problem list     Pertinent family history:  [] Migraines  [] Learning disability (ADHD, dyslexia)   [] Psych disorder (depression, anxiety)  * none of the above    Pertinent social history:  Work:   Education: 12th  Lives with wife    Illicit Drugs: denies  Alcohol/tobacco: working on [address] tobacco, no alcohol     Past Medical History:     - Any history of prior Concussion?   no    Past Medical History:   Diagnosis Date    Asthma      Patient Active Problem List   Diagnosis    Traumatic closed fracture of C6 vertebra with minimal displacement (HCC)       Medications:      Current Outpatient Medications   Medication Sig Dispense Refill    methocarbamol (ROBAXIN) 750 mg tablet Take 1 tablet (750 mg total) by mouth 2 (two) times a day as needed for muscle spasms 60 tablet 0    multivitamin (THERAGRAN) TABS Take 1 tablet by mouth daily      naproxen sodium (ANAPROX) 550 mg tablet Take 550 mg by mouth 2 (two) times a day with meals      acetaminophen (TYLENOL) 325 mg tablet Take 3 tablets (975 mg total) by mouth every 8 (eight) hours (Patient not taking: Reported on 3/24/2020) 30 tablet 0    prochlorperazine (COMPAZINE) 10 mg tablet Take 1 tablet (10 mg total) by mouth every 6 (six) hours as needed for nausea or vomiting 12 tablet 3     No current facility-administered medications for this visit           Allergies:    No Known Allergies    Family History:        Family History   Problem Relation Age of Onset    No Known Problems Mother     No Known Problems Father          Social History:       Social History     Socioeconomic History    Marital status: /Civil Union     Spouse name: Not on file    Number of children: Not on file    Years of education: Not on file    Highest education level: Not on file   Occupational History    Not on file   Social Needs    Financial resource strain: Not on file    Food insecurity:     Worry: Not on file     Inability: Not on file    Transportation needs:     Medical: Not on file     Non-medical: Not on file   Tobacco Use    Smoking status: Never Smoker    Smokeless tobacco: Never Used   Substance and Sexual Activity    Alcohol use: Not Currently    Drug use: Not Currently    Sexual activity: Not on file   Lifestyle    Physical activity:     Days per week: Not on file     Minutes per session: Not on file    Stress: Not on file   Relationships    Social connections:     Talks on phone: Not on file     Gets together: Not on file     Attends Restorationist service: Not on file     Active member of club or organization: Not on file     Attends meetings of clubs or organizations: Not on file     Relationship status: Not on file    Intimate partner violence:     Fear of current or ex partner: Not on file     Emotionally abused: Not on file     Physically abused: Not on file     Forced sexual activity: Not on file   Other Topics Concern    Not on file   Social History Narrative    Not on file       Objective:     Due to current Pandemic of the coronavirus with instructions for social isolation  Exam today was adjusted for patient and provider safety  Physical Exam:                                                                 Vitals:            Constitutional:    /80 (BP Location: Left arm, Patient Position: Sitting, Cuff Size: Adult)   Pulse 80   Resp 14   Ht 6' (1 829 m)   Wt 94 1 kg (207 lb 8 oz)   BMI 28 14 kg/m²   BP Readings from Last 3 Encounters:   03/24/20 104/80   01/13/20 104/72   12/09/19 122/72     Pulse Readings from Last 3 Encounters:   03/24/20 80   01/13/20 88   12/09/19 90         Well developed, well nourished, well groomed  No dysmorphic features  HEENT:  Normocephalic atraumatic  Chest:  Respirations regular and unlabored  Musculoskeletal:  Appears to have Full range of motion  Skin:  warm and dry, not diaphoretic  No apparent birthmarks or stigmata of neurocutaneous disease  Psychiatric:  Normal behavior and appropriate affect        Neurological Examination:     Mental status/cognitive function:   Orientated to time, place and person  Recent and remote memory intact  Attention span and concentration as well as fund of knowledge are appropriate for age  Normal language and spontaneous speech  Cranial Nerves:  III, IV, VI-Pupils were equal, round, and reactive to light  Extraocular movements were full and conjugate without nystagmus  VII-facial expression symmetric, intact forehead wrinkle, strong eye closure, symmetric smile    VIII-hearing grossly intact bilaterally   Motor Exam: symmetric bulk throughout  no atrophy, fasciculations or abnormal movements noted     Coordination:  no apparent dysmetria, ataxia or tremor noted  Gait: steady casual gait     Pertinent lab results:   09/01/2019:  CMP and CBC unremarkable     Imaging:   I have personally reviewed imaging and radiology read   - MRI Brain without contrast was normal outside our system in 2/2020  - also had and MRI C spine   - CTA head and neck with without contrast 09/02/2019  1  No acute intracranial hemorrhage, midline shift, or mass effect  2   No acute vascular injury identified within the arteries of the neck  3   No high-grade proximal stenosis of the visualized [place]  4   No significant intracranial arteriovenous malformation or aneurysm  5   Redemonstrated displaced fracture of the C6 facet on the left with stable grade 1 anterolisthesis of C6 on C7  Further evaluation with MRI may be obtained if there is concern for ligamentous injury  Review of Systems:   ROS obtained by medical assistant Personally reviewed and updated if indicated  Review of Systems   Constitutional: Negative  Negative for appetite change and fever  HENT: Negative  Negative for hearing loss, tinnitus, trouble swallowing and voice change  Eyes: Negative  Negative for photophobia and pain  Respiratory: Negative  Negative for shortness of breath  Cardiovascular: Negative  Negative for palpitations  Gastrointestinal: Negative  Negative for nausea and vomiting  Endocrine: Negative  Negative for cold intolerance  Genitourinary: Negative  Negative for dysuria, frequency and urgency  Musculoskeletal: Negative  Negative for myalgias and neck pain  Skin: Negative  Negative for rash  Allergic/Immunologic: Negative  Neurological: Negative  Negative for dizziness, tremors, seizures, syncope, facial asymmetry, speech difficulty, weakness, light-headedness, numbness and headaches  Hematological: Negative  Does not bruise/bleed easily  Psychiatric/Behavioral: Negative  Negative for confusion, hallucinations and sleep disturbance           I have spent 46 minutes with Patient  today in which greater than 50% of this time was spent in counseling/coordination of care regarding Prognosis, Risks and benefits of tx options, Intructions for management, Patient  education, Risk factor reductions and Impressions        Author:  Sebastian Mendoza MD   Fellowship trained Concussion Specialist

## 2020-03-24 ENCOUNTER — CONSULT (OUTPATIENT)
Dept: NEUROLOGY | Facility: CLINIC | Age: 45
End: 2020-03-24
Payer: OTHER MISCELLANEOUS

## 2020-03-24 VITALS
HEIGHT: 72 IN | SYSTOLIC BLOOD PRESSURE: 104 MMHG | DIASTOLIC BLOOD PRESSURE: 80 MMHG | BODY MASS INDEX: 28.1 KG/M2 | WEIGHT: 207.5 LBS | HEART RATE: 80 BPM | RESPIRATION RATE: 14 BRPM

## 2020-03-24 DIAGNOSIS — G44.86 CERVICOGENIC HEADACHE: ICD-10-CM

## 2020-03-24 DIAGNOSIS — G44.329 CHRONIC POST-TRAUMATIC HEADACHE, NOT INTRACTABLE: Primary | ICD-10-CM

## 2020-03-24 PROCEDURE — 99204 OFFICE O/P NEW MOD 45 MIN: CPT | Performed by: PSYCHIATRY & NEUROLOGY

## 2020-03-24 RX ORDER — PROCHLORPERAZINE MALEATE 10 MG
10 TABLET ORAL EVERY 6 HOURS PRN
Qty: 12 TABLET | Refills: 3 | Status: SHIPPED | OUTPATIENT
Start: 2020-03-24 | End: 2020-07-17 | Stop reason: SDUPTHER

## 2020-03-24 RX ORDER — NAPROXEN SODIUM 550 MG/1
550 TABLET ORAL 2 TIMES DAILY WITH MEALS
COMMUNITY
Start: 2020-02-05

## 2020-03-24 RX ORDER — DIPHENOXYLATE HYDROCHLORIDE AND ATROPINE SULFATE 2.5; .025 MG/1; MG/1
1 TABLET ORAL DAILY
COMMUNITY

## 2020-03-24 NOTE — PATIENT INSTRUCTIONS
I do not recommend any concussion specific therapies, does not sound like you had a concussion    Agree with PT for neck    Bring MRI Brain images next visit     Headache/migraine treatment:   Abortive medications (for immediate treatment of a headache): Ok to take ibuprofen or acetaminophen for headaches, but try to limit the amount and frequency that you are taking to avoid medication overuse/rebound headache  Ideally no more than 3 days per week  trial of prochlorperazine/Compazine which is technically a nausea medication but it can be used for headaches with and without nausea    Prescription preventive medications for headaches/migraines   (to take every day to help prevent headaches - not to take at the time of headache): Will hold off on starting for now, but if you change your mind call and I will order EKG and if this is normal then send in:  Amytriptyline start 10mg at bedtime  Increase by 10mg each week until good effect on headaches/pain or reach 50mg daily     *Typically these types of medications take time untill you see the benefit, although some may see improvement in days, often it may take weeks, especially if the medication is being titrated up to a beneficial level  Please contact us if there are any concerns or questions regarding the medication  Sleep/headache prevention:  -  Melatonin - you may take 3 mg nightly for sleep  You should take this 1 hour prior to bedtime consistently every night for it to work  It works by gradually helping to adjust your sleep time over days to weeks, rather than immediately making you feel sleepy  Lifestyle Recommendations:  - Maintain good sleep hygiene  Going to bed and waking up at consistent times, avoiding excessive daytime naps, avoiding caffeinated beverages in the evening, avoid excessive stimulation in the evening and generally using bed primarily for sleeping    One hour before bedtime would recommend turning lights down lower, decreasing your activity (may read quietly, listen to music at a low volume)  When you get into bed, should eliminate all technology (no texting, emailing, playing with your phone, iPad or tablet in bed)  - Maintain good hydration  Drink  2L of fluid a day (4 typical small water bottles)  - Maintain good nutrition  In particular don't skip meals and eat balanced meals regularly   - TOBACCO CESSATION: Kim Mason was educated regarding the impact of smoking and advised to quit  Willingness to quit was assessed and he is not willing at this time  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

## 2020-03-24 NOTE — PROGRESS NOTES
Review of Systems   Constitutional: Negative  Negative for appetite change and fever  HENT: Negative  Negative for hearing loss, tinnitus, trouble swallowing and voice change  Eyes: Negative  Negative for photophobia and pain  Respiratory: Negative  Negative for shortness of breath  Cardiovascular: Negative  Negative for palpitations  Gastrointestinal: Negative  Negative for nausea and vomiting  Endocrine: Negative  Negative for cold intolerance  Genitourinary: Negative  Negative for dysuria, frequency and urgency  Musculoskeletal: Negative  Negative for myalgias and neck pain  Skin: Negative  Negative for rash  Allergic/Immunologic: Negative  Neurological: Negative  Negative for dizziness, tremors, seizures, syncope, facial asymmetry, speech difficulty, weakness, light-headedness, numbness and headaches  Hematological: Negative  Does not bruise/bleed easily  Psychiatric/Behavioral: Negative  Negative for confusion, hallucinations and sleep disturbance

## 2020-04-22 ENCOUNTER — APPOINTMENT (OUTPATIENT)
Dept: URGENT CARE | Age: 45
End: 2020-04-22
Payer: OTHER MISCELLANEOUS

## 2020-04-22 PROCEDURE — 99213 OFFICE O/P EST LOW 20 MIN: CPT | Performed by: PREVENTIVE MEDICINE

## 2020-05-11 ENCOUNTER — EVALUATION (OUTPATIENT)
Dept: PHYSICAL THERAPY | Facility: CLINIC | Age: 45
End: 2020-05-11
Payer: OTHER MISCELLANEOUS

## 2020-05-11 DIAGNOSIS — R42 VERTIGO: ICD-10-CM

## 2020-05-11 DIAGNOSIS — S12.500D CLOSED TRAUMATIC MINIMALLY DISPLACED FRACTURE OF SIXTH CERVICAL VERTEBRA WITH ROUTINE HEALING, SUBSEQUENT ENCOUNTER: Primary | ICD-10-CM

## 2020-05-11 PROCEDURE — 97162 PT EVAL MOD COMPLEX 30 MIN: CPT | Performed by: PHYSICAL THERAPIST

## 2020-05-15 ENCOUNTER — APPOINTMENT (OUTPATIENT)
Dept: PHYSICAL THERAPY | Facility: CLINIC | Age: 45
End: 2020-05-15
Payer: OTHER MISCELLANEOUS

## 2020-05-18 ENCOUNTER — OFFICE VISIT (OUTPATIENT)
Dept: PHYSICAL THERAPY | Facility: CLINIC | Age: 45
End: 2020-05-18
Payer: OTHER MISCELLANEOUS

## 2020-05-18 DIAGNOSIS — R42 VERTIGO: ICD-10-CM

## 2020-05-18 DIAGNOSIS — S12.500D CLOSED TRAUMATIC MINIMALLY DISPLACED FRACTURE OF SIXTH CERVICAL VERTEBRA WITH ROUTINE HEALING, SUBSEQUENT ENCOUNTER: Primary | ICD-10-CM

## 2020-05-18 PROCEDURE — 97112 NEUROMUSCULAR REEDUCATION: CPT

## 2020-05-18 PROCEDURE — 97110 THERAPEUTIC EXERCISES: CPT

## 2020-05-18 PROCEDURE — 97140 MANUAL THERAPY 1/> REGIONS: CPT | Performed by: PHYSICAL THERAPIST

## 2020-05-18 PROCEDURE — 97140 MANUAL THERAPY 1/> REGIONS: CPT

## 2020-05-26 ENCOUNTER — OFFICE VISIT (OUTPATIENT)
Dept: PHYSICAL THERAPY | Facility: CLINIC | Age: 45
End: 2020-05-26
Payer: OTHER MISCELLANEOUS

## 2020-05-26 DIAGNOSIS — S12.500D CLOSED TRAUMATIC MINIMALLY DISPLACED FRACTURE OF SIXTH CERVICAL VERTEBRA WITH ROUTINE HEALING, SUBSEQUENT ENCOUNTER: Primary | ICD-10-CM

## 2020-05-26 DIAGNOSIS — R42 VERTIGO: ICD-10-CM

## 2020-05-26 PROCEDURE — 97140 MANUAL THERAPY 1/> REGIONS: CPT | Performed by: PHYSICAL THERAPIST

## 2020-05-26 PROCEDURE — 97112 NEUROMUSCULAR REEDUCATION: CPT | Performed by: PHYSICAL THERAPIST

## 2020-05-26 PROCEDURE — 97110 THERAPEUTIC EXERCISES: CPT | Performed by: PHYSICAL THERAPIST

## 2020-05-27 ENCOUNTER — APPOINTMENT (OUTPATIENT)
Dept: URGENT CARE | Age: 45
End: 2020-05-27

## 2020-05-28 ENCOUNTER — OFFICE VISIT (OUTPATIENT)
Dept: PHYSICAL THERAPY | Facility: CLINIC | Age: 45
End: 2020-05-28
Payer: OTHER MISCELLANEOUS

## 2020-05-28 DIAGNOSIS — R42 VERTIGO: ICD-10-CM

## 2020-05-28 DIAGNOSIS — S12.500D CLOSED TRAUMATIC MINIMALLY DISPLACED FRACTURE OF SIXTH CERVICAL VERTEBRA WITH ROUTINE HEALING, SUBSEQUENT ENCOUNTER: Primary | ICD-10-CM

## 2020-05-28 PROCEDURE — 97140 MANUAL THERAPY 1/> REGIONS: CPT | Performed by: PHYSICAL THERAPIST

## 2020-05-28 PROCEDURE — 97110 THERAPEUTIC EXERCISES: CPT | Performed by: PHYSICAL THERAPIST

## 2020-05-28 PROCEDURE — 97112 NEUROMUSCULAR REEDUCATION: CPT | Performed by: PHYSICAL THERAPIST

## 2020-06-02 ENCOUNTER — OFFICE VISIT (OUTPATIENT)
Dept: PHYSICAL THERAPY | Facility: CLINIC | Age: 45
End: 2020-06-02
Payer: OTHER MISCELLANEOUS

## 2020-06-02 DIAGNOSIS — R42 VERTIGO: ICD-10-CM

## 2020-06-02 DIAGNOSIS — S12.500D CLOSED TRAUMATIC MINIMALLY DISPLACED FRACTURE OF SIXTH CERVICAL VERTEBRA WITH ROUTINE HEALING, SUBSEQUENT ENCOUNTER: Primary | ICD-10-CM

## 2020-06-02 PROCEDURE — 97112 NEUROMUSCULAR REEDUCATION: CPT

## 2020-06-02 PROCEDURE — 97110 THERAPEUTIC EXERCISES: CPT

## 2020-06-02 PROCEDURE — 97140 MANUAL THERAPY 1/> REGIONS: CPT

## 2020-06-04 ENCOUNTER — APPOINTMENT (OUTPATIENT)
Dept: PHYSICAL THERAPY | Facility: CLINIC | Age: 45
End: 2020-06-04
Payer: OTHER MISCELLANEOUS

## 2020-06-09 ENCOUNTER — OFFICE VISIT (OUTPATIENT)
Dept: PHYSICAL THERAPY | Facility: CLINIC | Age: 45
End: 2020-06-09
Payer: OTHER MISCELLANEOUS

## 2020-06-09 DIAGNOSIS — R42 VERTIGO: ICD-10-CM

## 2020-06-09 DIAGNOSIS — S12.500D CLOSED TRAUMATIC MINIMALLY DISPLACED FRACTURE OF SIXTH CERVICAL VERTEBRA WITH ROUTINE HEALING, SUBSEQUENT ENCOUNTER: Primary | ICD-10-CM

## 2020-06-09 PROCEDURE — 97110 THERAPEUTIC EXERCISES: CPT

## 2020-06-09 PROCEDURE — 97112 NEUROMUSCULAR REEDUCATION: CPT

## 2020-06-09 PROCEDURE — 97140 MANUAL THERAPY 1/> REGIONS: CPT

## 2020-06-11 ENCOUNTER — OFFICE VISIT (OUTPATIENT)
Dept: PHYSICAL THERAPY | Facility: CLINIC | Age: 45
End: 2020-06-11
Payer: OTHER MISCELLANEOUS

## 2020-06-11 DIAGNOSIS — R42 VERTIGO: ICD-10-CM

## 2020-06-11 DIAGNOSIS — S12.500D CLOSED TRAUMATIC MINIMALLY DISPLACED FRACTURE OF SIXTH CERVICAL VERTEBRA WITH ROUTINE HEALING, SUBSEQUENT ENCOUNTER: Primary | ICD-10-CM

## 2020-06-11 PROCEDURE — 97140 MANUAL THERAPY 1/> REGIONS: CPT | Performed by: PHYSICAL THERAPIST

## 2020-06-11 PROCEDURE — 97112 NEUROMUSCULAR REEDUCATION: CPT

## 2020-06-11 PROCEDURE — 97110 THERAPEUTIC EXERCISES: CPT | Performed by: PHYSICAL THERAPIST

## 2020-06-16 ENCOUNTER — EVALUATION (OUTPATIENT)
Dept: PHYSICAL THERAPY | Facility: CLINIC | Age: 45
End: 2020-06-16
Payer: OTHER MISCELLANEOUS

## 2020-06-16 DIAGNOSIS — S12.500D CLOSED TRAUMATIC MINIMALLY DISPLACED FRACTURE OF SIXTH CERVICAL VERTEBRA WITH ROUTINE HEALING, SUBSEQUENT ENCOUNTER: Primary | ICD-10-CM

## 2020-06-16 DIAGNOSIS — R42 VERTIGO: ICD-10-CM

## 2020-06-16 PROCEDURE — 97010 HOT OR COLD PACKS THERAPY: CPT | Performed by: PHYSICAL THERAPIST

## 2020-06-16 PROCEDURE — 97110 THERAPEUTIC EXERCISES: CPT | Performed by: PHYSICAL THERAPIST

## 2020-06-16 PROCEDURE — 97112 NEUROMUSCULAR REEDUCATION: CPT | Performed by: PHYSICAL THERAPIST

## 2020-06-16 PROCEDURE — 97140 MANUAL THERAPY 1/> REGIONS: CPT | Performed by: PHYSICAL THERAPIST

## 2020-06-18 ENCOUNTER — OFFICE VISIT (OUTPATIENT)
Dept: PHYSICAL THERAPY | Facility: CLINIC | Age: 45
End: 2020-06-18
Payer: OTHER MISCELLANEOUS

## 2020-06-18 DIAGNOSIS — R42 VERTIGO: ICD-10-CM

## 2020-06-18 DIAGNOSIS — S12.500D CLOSED TRAUMATIC MINIMALLY DISPLACED FRACTURE OF SIXTH CERVICAL VERTEBRA WITH ROUTINE HEALING, SUBSEQUENT ENCOUNTER: Primary | ICD-10-CM

## 2020-06-18 PROCEDURE — 97140 MANUAL THERAPY 1/> REGIONS: CPT

## 2020-06-18 PROCEDURE — 97112 NEUROMUSCULAR REEDUCATION: CPT

## 2020-06-18 PROCEDURE — 97110 THERAPEUTIC EXERCISES: CPT

## 2020-06-23 ENCOUNTER — OFFICE VISIT (OUTPATIENT)
Dept: PHYSICAL THERAPY | Facility: CLINIC | Age: 45
End: 2020-06-23
Payer: OTHER MISCELLANEOUS

## 2020-06-23 DIAGNOSIS — S12.500D CLOSED TRAUMATIC MINIMALLY DISPLACED FRACTURE OF SIXTH CERVICAL VERTEBRA WITH ROUTINE HEALING, SUBSEQUENT ENCOUNTER: Primary | ICD-10-CM

## 2020-06-23 DIAGNOSIS — R42 VERTIGO: ICD-10-CM

## 2020-06-23 PROCEDURE — 97140 MANUAL THERAPY 1/> REGIONS: CPT

## 2020-06-23 PROCEDURE — 97112 NEUROMUSCULAR REEDUCATION: CPT

## 2020-06-23 PROCEDURE — 97110 THERAPEUTIC EXERCISES: CPT

## 2020-06-25 ENCOUNTER — APPOINTMENT (OUTPATIENT)
Dept: PHYSICAL THERAPY | Facility: CLINIC | Age: 45
End: 2020-06-25
Payer: OTHER MISCELLANEOUS

## 2020-06-29 ENCOUNTER — OFFICE VISIT (OUTPATIENT)
Dept: PHYSICAL THERAPY | Facility: CLINIC | Age: 45
End: 2020-06-29
Payer: OTHER MISCELLANEOUS

## 2020-06-29 DIAGNOSIS — R42 VERTIGO: ICD-10-CM

## 2020-06-29 DIAGNOSIS — S12.500D CLOSED TRAUMATIC MINIMALLY DISPLACED FRACTURE OF SIXTH CERVICAL VERTEBRA WITH ROUTINE HEALING, SUBSEQUENT ENCOUNTER: Primary | ICD-10-CM

## 2020-06-29 PROCEDURE — 97110 THERAPEUTIC EXERCISES: CPT | Performed by: PHYSICAL THERAPIST

## 2020-06-29 PROCEDURE — 97112 NEUROMUSCULAR REEDUCATION: CPT | Performed by: PHYSICAL THERAPIST

## 2020-06-29 PROCEDURE — 97140 MANUAL THERAPY 1/> REGIONS: CPT | Performed by: PHYSICAL THERAPIST

## 2020-06-29 PROCEDURE — 97530 THERAPEUTIC ACTIVITIES: CPT | Performed by: PHYSICAL THERAPIST

## 2020-06-30 ENCOUNTER — APPOINTMENT (OUTPATIENT)
Dept: PHYSICAL THERAPY | Facility: CLINIC | Age: 45
End: 2020-06-30
Payer: OTHER MISCELLANEOUS

## 2020-07-07 ENCOUNTER — OFFICE VISIT (OUTPATIENT)
Dept: PHYSICAL THERAPY | Facility: CLINIC | Age: 45
End: 2020-07-07
Payer: OTHER MISCELLANEOUS

## 2020-07-07 DIAGNOSIS — R42 VERTIGO: ICD-10-CM

## 2020-07-07 DIAGNOSIS — S12.500D CLOSED TRAUMATIC MINIMALLY DISPLACED FRACTURE OF SIXTH CERVICAL VERTEBRA WITH ROUTINE HEALING, SUBSEQUENT ENCOUNTER: Primary | ICD-10-CM

## 2020-07-07 PROCEDURE — 97112 NEUROMUSCULAR REEDUCATION: CPT

## 2020-07-07 PROCEDURE — 97110 THERAPEUTIC EXERCISES: CPT

## 2020-07-07 PROCEDURE — 97140 MANUAL THERAPY 1/> REGIONS: CPT

## 2020-07-07 NOTE — PROGRESS NOTES
Daily Note     Today's date: 2020  Patient name: Zakiya Russell  : 1975  MRN: [id number]  Referring provider: Sofy Hernandez  Dx:   Encounter Diagnosis     ICD-10-CM    1  Closed traumatic minimally displaced fracture of sixth cervical vertebra with routine healing, subsequent encounter S12 500D    2  Vertigo R42        Start Time: 2733  Stop Time: 1610  Total time in clinic (min): 55 minutes    Subjective: Pt reports that he felt unbalanced when he was skimming the pool  Pt notes that his cervical pain is a lot better but occassionally feels pain at night that wakes him up  Objective: See treatment diary below  Improved FOTO      Assessment: Tolerated treatment well  Challenged with RROM R>L shoulder ABD this session   Limited with R cervical rotation passively  Continues with dizziness after vestibular exercises, resolves within 5 minutes  Plan: Continue per plan of care        Goals: Patient's goal is to regain mobility, get back to normal, lifting #150 with another person       Precautions: Asthma, C6 anteriorlisthesis- stable, fx C6 T TP 2019, concussive sxs  Dx:L cervical R sb ROM deficits- L shoulder/UE weakness>R  Vertigo    Daily Treatment Diary   Manuals  07            C-PROM 8' 6' LNK LNK 8' LNK   L Resisted diagonal scap PNF inf/depr           L rotation c L rib 1 inf glide 5 5' [address] 2'    Ther Ex         Pulleys 3 3' 3'  3'    Lev scap st :20/3 10: x3       SA Alphabet B  nv 1x 1#  1x 2# 1x #2 2# 1x    Seated R SB AROM  10x 10x 10x 10x 10x   Thoracic ext over chair 10x :10 10x :10 10x10: 10x10: 10x :10 10x :10   Seated L rotation AROM  10x 10x 10x 10x 10x   UBE  nv 2'/2' 3'/3' 3/3 3'/3'   Wall walks RTB  5 laps       Flex/abd AROM c #2-3  nv 10x 2#  2x10 2#  2x 10 #2 2x10 2#                              RTB bicep/tricep GTB 2x10 nv GTB  2x10  GTB  2x10 GTB 2x10    L C-rotation snag c L rib 1 inf mob 20        Neuro Re-ed         DNF supine  10x 3" 10x 3" 10x3" 20x :03 20x :03            VOR- 1 object head turns 20 20 20 20  20   VOR 2 object no head turns 20 20 20 20  20   Gait c head turns 5 laps nv 5 laps 5 laps 5 laps 5 laps   Ther Activity                                    Gait Training                           Modalities         MHP  10'  10' declined 10'

## 2020-07-09 ENCOUNTER — OFFICE VISIT (OUTPATIENT)
Dept: PHYSICAL THERAPY | Facility: CLINIC | Age: 45
End: 2020-07-09
Payer: OTHER MISCELLANEOUS

## 2020-07-09 DIAGNOSIS — R42 VERTIGO: ICD-10-CM

## 2020-07-09 DIAGNOSIS — S12.500D CLOSED TRAUMATIC MINIMALLY DISPLACED FRACTURE OF SIXTH CERVICAL VERTEBRA WITH ROUTINE HEALING, SUBSEQUENT ENCOUNTER: Primary | ICD-10-CM

## 2020-07-09 PROCEDURE — 97110 THERAPEUTIC EXERCISES: CPT

## 2020-07-09 PROCEDURE — 97140 MANUAL THERAPY 1/> REGIONS: CPT

## 2020-07-09 NOTE — PROGRESS NOTES
Daily Note     Today's date: 2020  Patient name: Jud Betancourt  : 1975  MRN: [id number]  Referring provider: Jorge Flores*  Dx:   Encounter Diagnosis     ICD-10-CM    1  Closed traumatic minimally displaced fracture of sixth cervical vertebra with routine healing, subsequent encounter S12 500D    2  Vertigo R42                   Subjective: PT noted that he is feeling good, and is making steady progress at physical therapy  Objective: See treatment diary below      Assessment: Tolerated treatment well  Patient demonstrated good posture and therex technique throughout session  Plan: Continue per plan of care       Goals: Patient's goal is to regain mobility, get back to normal, lifting #150 with another person       Precautions: Asthma, C6 anteriorlisthesis- stable, fx C6 T TP 2019, concussive sxs  Dx:L cervical R sb ROM deficits- L shoulder/UE weakness>R  Vertigo    Daily Treatment Diary   Manuals                C-PROM 8' 6' LNK LNK 8' LNK 8'    L Resisted diagonal scap PNF inf/depr             L rotation c L rib 1 inf glide 5 5' [address] 2'  [address]    Ther Ex           Pulleys 3 3' 3'  3'      Lev scap st :20/3 10: x3         SA Alphabet B  nv 1x 1#  1x 2# 1x #2 2# 1x  2# x1    Seated R SB AROM  10x 10x 10x 10x 10x 10x    Thoracic ext over chair 10x :10 10x :10 10x10: 10x10: 10x :10 10x :10 10x10"    Seated L rotation AROM  10x 10x 10x 10x 10x 10x    UBE  nv 2'/2' 3'/3' 3/3 3'/3' 3'/3'    Wall walks RTB  5 laps         Flex/abd AROM c #2-3  nv 10x 2#  2x10 2#  2x 10 #2 2x10 2# 2x10 2#                                     RTB bicep/tricep GTB 2x10 nv GTB  2x10  GTB  2x10 GTB 2x10  GTB 2x10    L C-rotation snag c L rib 1 inf mob 20          Neuro Re-ed           DNF supine  10x 3" 10x 3" 10x3" 20x :03 20x :03                VOR- 1 object head turns 20 20 20 20  20     VOR 2 object no head turns 20 20 20 20  20     Gait c head turns 5 laps nv 5 laps 5 laps 5 laps 5 laps  5 laps    Ther Activity                                            Gait Training                                 Modalities           MHP  10'  10' declined 10' Declined

## 2020-07-14 ENCOUNTER — OFFICE VISIT (OUTPATIENT)
Dept: PHYSICAL THERAPY | Facility: CLINIC | Age: 45
End: 2020-07-14
Payer: OTHER MISCELLANEOUS

## 2020-07-14 DIAGNOSIS — R42 VERTIGO: ICD-10-CM

## 2020-07-14 DIAGNOSIS — S12.500D CLOSED TRAUMATIC MINIMALLY DISPLACED FRACTURE OF SIXTH CERVICAL VERTEBRA WITH ROUTINE HEALING, SUBSEQUENT ENCOUNTER: Primary | ICD-10-CM

## 2020-07-14 PROCEDURE — 97140 MANUAL THERAPY 1/> REGIONS: CPT

## 2020-07-14 PROCEDURE — 97112 NEUROMUSCULAR REEDUCATION: CPT

## 2020-07-14 PROCEDURE — 97110 THERAPEUTIC EXERCISES: CPT

## 2020-07-14 NOTE — PROGRESS NOTES
Daily Note     Today's date: 2020  Patient name: Kira Arteaga  : 1975  MRN: [id number]  Referring provider: Stacia Spencer*  Dx:   Encounter Diagnosis     ICD-10-CM    1  Closed traumatic minimally displaced fracture of sixth cervical vertebra with routine healing, subsequent encounter S12 500D    2  Vertigo R42                   Subjective: Patient states that he continues to suffer from HA's at least 2/3x per week and continues to feel dizzy or off balance when walking and turning his head to look over his shoulder  Objective: See treatment diary below      Assessment: Tolerated treatment well  Patient able to progress with box lifts without complaints of pain  Plan: Continue per plan of care        Goals: Patient's goal is to regain mobility, get back to normal, lifting #150 with another person       Precautions: Asthma, C6 anteriorlisthesis- stable, fx C6 T TP 2019, concussive sxs  Dx:L cervical R sb ROM deficits- L shoulder/UE weakness>R  Vertigo    Daily Treatment Diary   Manuals               C-PROM 8' 6' LNK LNK 8' LNK 8' 8   L Resisted diagonal scap PNF inf/depr             L rotation c L rib 1 inf glide 5 5' [address] 2'  [address] 3 3' 3'  3'      Lev scap st :20/3 10: x3         SA Alphabet B  nv 1x 1#  1x 2# 1x #2 2# 1x  2# x1 2# x 1   Seated R SB AROM  10x 10x 10x 10x 10x 10x 10x   Thoracic ext over chair 10x :10 10x :10 10x10: 10x10: 10x :10 10x :10 10x10" 10" x 10    Seated L rotation AROM  10x 10x 10x 10x 10x 10x 10x   UBE  nv 2'/2' 3'/3' 3/3 3'/3' 3'/3' 3/3   Wall walks RTB  5 laps         Flex/abd AROM c #2-3  nv 10x 2#  2x10 2#  2x 10 #2 2x10 2# 2x10 2# 2# 12    Box lifts        0# 2 x10                         RTB bicep/tricep GTB 2x10 nv GTB  2x10  GTB  2x10 GTB 2x10  GTB 2x10 GTB 2 x 10    L C-rotation snag c L rib 1 inf mob 20          Neuro Re-ed           DNF supine  10x 3" 10x 3" 10x3" 20x :03 20x :03  :03 x 20    Biodex        LOS lvl 6 x 2    VOR- 1 object head turns 20 20 20 20  20     VOR 2 object no head turns 20 20 20 20  20     Gait c head turns 5 laps nv 5 laps 5 laps 5 laps 5 laps  5 laps 3 laps - VC's   Ther Activity                                            Gait Training                                 Modalities           MHP  10'  10' declined 10' Declined declined

## 2020-07-16 ENCOUNTER — OFFICE VISIT (OUTPATIENT)
Dept: PHYSICAL THERAPY | Facility: CLINIC | Age: 45
End: 2020-07-16
Payer: OTHER MISCELLANEOUS

## 2020-07-16 DIAGNOSIS — S12.500D CLOSED TRAUMATIC MINIMALLY DISPLACED FRACTURE OF SIXTH CERVICAL VERTEBRA WITH ROUTINE HEALING, SUBSEQUENT ENCOUNTER: Primary | ICD-10-CM

## 2020-07-16 DIAGNOSIS — R42 VERTIGO: ICD-10-CM

## 2020-07-16 PROCEDURE — 97110 THERAPEUTIC EXERCISES: CPT

## 2020-07-16 PROCEDURE — 97140 MANUAL THERAPY 1/> REGIONS: CPT

## 2020-07-16 PROCEDURE — 97112 NEUROMUSCULAR REEDUCATION: CPT

## 2020-07-16 PROCEDURE — 97010 HOT OR COLD PACKS THERAPY: CPT

## 2020-07-16 NOTE — PROGRESS NOTES
Daily Note     Today's date: 2020  Patient name: Marcy Abrams  : 1975  MRN: [id number]  Referring provider: Davin Pettit*  Dx:   Encounter Diagnosis     ICD-10-CM    1  Closed traumatic minimally displaced fracture of sixth cervical vertebra with routine healing, subsequent encounter S12 500D    2  Vertigo R42            1:1 with PTA CR 3:30- 4:40  MH 4:40-4:50  Subjective: Typically experiences HA's the day following PT  Stiff with 3/10 pain on left > right  Objective: See treatment diary below      Assessment: Tolerated treatment well  STM added per primary PT request  Mild hypertonicity and min TTP  Continues with strength and ROM deficits  Patient demonstrated fatigue post treatment and would benefit from continued PT      Plan: Continue per plan of care  Goals: Patient's goal is to regain mobility, get back to normal, lifting #150 with another person       Precautions: Asthma, C6 anteriorlisthesis- stable, fx C6 T TP 2019, concussive sxs  Dx:L cervical R sb ROM deficits- L shoulder/UE weakness>R  Vertigo    Daily Treatment Diary   Manuals    STM CR  10 mins          C-PROM CR  8 mins 6' LNK LNK 8' LNK 8' 8   L Resisted diagonal scap PNF inf/depr             L rotation c L rib 1 inf glide NP 5' [address] 2'  [address]  3' 3'  3'      Lev scap st  10: x3         SA Alphabet B 2#  x1 nv 1x 1#  1x 2# 1x #2 2# 1x  2# x1 2# x 1   Seated R SB AROM 10 10x 10x 10x 10x 10x 10x 10x   Thoracic ext over chair 10"x10 10x :10 10x10: 10x10: 10x :10 10x :10 10x10" 10" x 10    Seated L rotation AROM 10 10x 10x 10x 10x 10x 10x 10x   UBE 3'/3' nv 2'/2' 3'/3' 3/3 3'/3' 3'/3' 3/3   Wall walks RTB  5 laps         Flex/abd AROM c #2-3 2#  2x10 ea   nv 10x 2#  2x10 2#  2x 10 #2 2x10 2# 2x10 2# 2# 12    Box lifts 0#  2x10       0# 2 x10                         RTB bicep/tricep GTB  3x10 nv GTB  2x10  GTB  2x10 GTB 2x10  GTB 2x10 GTB 2 x 10    L C-rotation snag c L rib 1 inf mob           Neuro Re-ed           DNF supine 3"x20 10x 3" 10x 3" 10x3" 20x :03 20x :03  :03 x 20    Biodex LOS  L7  x2       LOS lvl 6 x 2    VOR- 1 object head turns  20 20 20  20     VOR 2 object no head turns  20 20 20  20     Gait c head turns 3 laps  VC's nv 5 laps 5 laps 5 laps 5 laps  5 laps 3 laps - VC's   Ther Activity                                            Gait Training                                 Modalities           MHP 10 mins 10'  10' declined 10' Declined declined

## 2020-07-17 ENCOUNTER — OFFICE VISIT (OUTPATIENT)
Dept: NEUROLOGY | Facility: CLINIC | Age: 45
End: 2020-07-17
Payer: OTHER MISCELLANEOUS

## 2020-07-17 ENCOUNTER — TELEPHONE (OUTPATIENT)
Dept: NEUROLOGY | Facility: CLINIC | Age: 45
End: 2020-07-17

## 2020-07-17 ENCOUNTER — APPOINTMENT (OUTPATIENT)
Dept: URGENT CARE | Facility: MEDICAL CENTER | Age: 45
End: 2020-07-17

## 2020-07-17 VITALS
HEIGHT: 72 IN | BODY MASS INDEX: 28.63 KG/M2 | SYSTOLIC BLOOD PRESSURE: 112 MMHG | WEIGHT: 211.4 LBS | DIASTOLIC BLOOD PRESSURE: 80 MMHG | HEART RATE: 90 BPM | TEMPERATURE: 97.9 F

## 2020-07-17 DIAGNOSIS — G44.329 CHRONIC POST-TRAUMATIC HEADACHE, NOT INTRACTABLE: ICD-10-CM

## 2020-07-17 PROCEDURE — 99214 OFFICE O/P EST MOD 30 MIN: CPT | Performed by: PSYCHIATRY & NEUROLOGY

## 2020-07-17 RX ORDER — AMITRIPTYLINE HYDROCHLORIDE 10 MG/1
TABLET, FILM COATED ORAL
Qty: 150 TABLET | Refills: 3 | Status: SHIPPED | OUTPATIENT
Start: 2020-07-17

## 2020-07-17 RX ORDER — PROCHLORPERAZINE MALEATE 10 MG
10 TABLET ORAL EVERY 6 HOURS PRN
Qty: 12 TABLET | Refills: 3 | Status: SHIPPED | OUTPATIENT
Start: 2020-07-17

## 2020-07-17 NOTE — PROGRESS NOTES
[facility] Consult - Follow up   PATIENT:  Misael Rivera  MRN:  [id number]  :  1975  DATE OF SERVICE:  2020  REFERRED BY: No ref  provider found  PMD: Jorge Simms    Assessment/Plan:     Misael Rivera is a delightful 39 y o  male with a past medical history that includes asthma, hypomagnesemia, tobacco abuse referred here for evaluation of posttraumatic headache  My initial evaluation 2020  Follow-up 2020     Chronic post-traumatic headache, not intractable  Cervicogenic headache  On 2019, he was working pushing stacked pallets when they accidentally fell hitting him on the head and neck  He does not describe a typical constellation of concussion symptoms and we discussed this was not likely a concussion, but he certainly had other injuries including neck fracture  He was subsequently evaluated in the emergency department, admitted overnight and has been following with Neurosurgery and most recently physical therapy for his neck fracture  He has been following with worker's Comp as well and reports he has had an unremarkable MRI of the brain which I do not have the report of  He was referred for chronic posttraumatic headache   - as of 2020 he reports headaches have gradually improved although he is still having significant headaches 4 times a week that last all day with migrainous, cervicogenic and tension features  He also occasionally may feel a bit off balance when looking up or standing up quickly, likely related to the neck injury  - As of 2020:  He reports no new or concerning symptoms, posttraumatic headaches continue approximately 3-4 times per week very in location with some neuralgia type features and migrainous type features  Up to 1-2 days a week is more significant    Trial of amitriptyline for headache prevention      Workup:  - patient reports normal MRI brain from 2020 obtain outside our system and I cannot officially review, he will bring in for my review next visit (reminded 7/17/2020)  He also reports MRI C-spine which I do not have the records for - he is following with Neurosurgery for this     We discussed that his headaches sound like posttraumatic headaches with migrainous features as well as components of cervicogenic and tension headaches  We discussed that I agree with physical therapy for neck and that he should continue to improve with time        We have discussed concussions and the natural course of recovery  We discussed that from history does not sound like he had a concussion  We have discussed that even if he did have a concussion, symptoms from a concussion typically take 2 weeks to resolve, and although sometimes it can feel like concussion symptoms linger on, at this point these symptoms would be related to other factors      Headache Preventive:  - Discussed headache hygiene and lifestyle factors that may improve headaches   - trial of amitriptyline with gradual titration of 50 mg nightly  Discussed proper use, possible side effects and risks  - past/failed:  Gabapentin side effects, propranolol would be contraindicated due to history of asthma  - future options:  Venlafaxine, topiramate     Headache Abortive:  - Discussed not taking over-the-counter or prescription headache abortive more than 3 days per week to prevent medication overuse headache  - continue prochlorperazine 10 mg as needed for nausea or headache with migrainous features  Discussed proper use, possible side effects and risks            Likelihood of Concussion:  Witnessed mechanism  yes   Typical Symptoms no   Onset of symptoms within 24H no   Improving Time Course yes   Is there an alternative Diagnosis yes      Typical Symptoms= Yes if:  ? 1 A symptoms: Loss of consciousness or Amnesia  ?  3 B symptoms: Confusion/Fogginess, Headache, Dizziness/Loss  of  Balance, Nausea/Vomiting, Drowsiness, Vision Changes/Photophobia, Phonophobia, Tinnitus, Mood  change     How would you classify the concussion?   not a concussion      Patient inctructions:      EKG ordered   Agree with PT for neck     Bring MRI Brain images next visit and radiology report      Headache/migraine treatment:   Abortive medications (for immediate treatment of a headache): Ok to take ibuprofen or acetaminophen for headaches, but try to limit the amount and frequency that you are taking to avoid medication overuse/rebound headache  Ideally no more than 3 days per week      prochlorperazine/Compazine which is technically a nausea medication but it can be used for headaches with and without nausea     Prescription preventive medications for headaches/migraines   (to take every day to help prevent headaches - not to take at the time of headache):  Amytriptyline start 10mg at bedtime  Increase by 10mg each week until good effect on headaches/pain or reach 50mg daily      *Typically these types of medications take time untill you see the benefit, although some may see improvement in days, often it may take weeks, especially if the medication is being titrated up to a beneficial level  Please contact us if there are any concerns or questions regarding the medication       Sleep/headache prevention:  -  Melatonin - you may take 3 mg nightly for sleep  You should take this 1 hour prior to bedtime consistently every night for it to work  It works by gradually helping to adjust your sleep time over days to weeks, rather than immediately making you feel sleepy        Lifestyle Recommendations:  - Maintain good sleep hygiene  Going to bed and waking up at consistent times, avoiding excessive daytime naps, avoiding caffeinated beverages in the evening, avoid excessive stimulation in the evening and generally using bed primarily for sleeping    One hour before bedtime would recommend turning lights down lower, decreasing your activity (may read quietly, listen to music at a low volume)  When you get into bed, should eliminate all technology (no texting, emailing, playing with your phone, iPad or tablet in bed)  - Maintain good hydration  Drink  2L of fluid a day (4 typical small water bottles)  - Maintain good nutrition  In particular don't skip meals and eat balanced meals regularly   - TOBACCO CESSATION: Joe Kenny was educated regarding the impact of smoking and advised to quit  Willingness to quit was assessed and he is not willing at this time  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, [place]  [employer]  com          Education and Follow-up  - Please contact us if any questions or concerns arise  Of course, try to protect yourself from head injuries, and if any new concerning symptoms or significant blow to the head or body go to the emergency department  - Follow up 3 months, sooner if needed               CC:   Joe Kenny is a 39y o  year old  right handed male who presents for evaluation following a post trauamtic      History obtained from patient as well as available medical record review      Workers comp case  This is a Concussion Case that may be under litigation  Patient understands that we will not be writing any letters or working with  on their behalf  However, we will continue to do our best to provide the best medical care possible      History of Present Illness:   Current medical illnesses or past medical history include asthma, hypomagnesemia, tobacco abuse    Interval history as of 7/17/2020:    He did not bring in outside MRI brain images on CD for my review  He has been following with physical therapy for neck fracture  Working on quiting smoking     Posttraumatic headaches continue  - At last visit 03/24/2020 he wanted to hold off on prescription preventative  - I prescribed prochlorperazine 10 mg as needed for nausea or headache with migrainous features - helped sometimes and not others   - On avg 3-4 days per week, varies in location, either temple or frontal - sharp or occipital - burning with migrainous features  All day up to 2 days when the worst - 1-2 times a week   - Dizzy with quick movements  - Neck pain some  - PT is helping neck mobility      Date/Specifics as of our initial visit 03/24/2020:   On 8/27/19, he was working as a  when he dropped a Pallet of foam pads off a laron while working  [employer] [place] initially lost his balance, and it hit him twice  The palate hit him on top of his head   was wearing a hard hat  Acute symptoms included: no LOC, no amnesia, He experience a crunching sensation and severe pain in his neck  No one was around and it was traumatic  He was pinned down  Lauri Woodson dismissed it and told him to take a couple minute break and get back to work  He felt like could not function because everything hurt really bad, dizzy, SOB  Worked over the next few days and walking hunched over  He went to an urgent care and had plain films that were concerning for facet fracture       9/1/20 He was evaluated in the emergency department and admitted to the Trauma service with Neurosurgery consultation overnight  He suffered traumatic closed fracture of C6 vertebra with minimal displacement     Has been following with Neurosurgery, please see EMR for details of notes from the past 6-7 months  - last visit 01/13/2020 was cleared to begin PT with no restriction, refilled Robaxin, no longer taking gabapentin  - was going to [facility] and now switching to [facility]        - as of 3/24/2020 gradual improvement in symptoms, still getting headaches and when he looks up, or gets up quickly sometimes feels off balance        Headaches  How often do the headaches occur?   - mild headaches twice a month before that would go away with tylenol   - as of 3/24/2020:  4 times a week lasting all day (before was all day every day or lasting a week straight)  What time of the day do the headaches start?   Wakes up with it usually, sometimes around noon      Aura? without aura     Where is your headache located and pain quality?   - varies, mostly right temple - sharp pain  - mid occipital and shoots up the back of the head and then becomes diffuse   What is the intensity of pain? Average: 6/10, worst 10/10  Associated symptoms:   [x] Nausea       [] Vomiting        [] Diarrhea  [x] Insomnia    [x] Stiff or sore neck   [x] Problems with concentration  [x] Photophobia     [x]Phonophobia      [] Osmophobia  [x] Blurred vision   [x] Prefer quiet, dark room  [x] Light-headed or dizzy     [x] Tinnitus   [] Hands or feet tingle or feel numb/paresthesias       [] Red ear      [] Ptosis      [] Facial droop  [x] Lacrimation - both  [] Nasal congestion/rhinorrhea   [] Flushing of face  [] Change in pupil size     Things that make the headache worse? No specific movements, any movement      Headache triggers:  unknown     Have you seen someone else for headaches or pain? Yes  Have you had trigger point injection performed and how often? No  Have you had Botox injection performed and how often? No   Have you had epidural injections or transforaminal injections performed? No  Have you ever had any Brain imaging? yes - MRI Brain without contrast was normal outside our system in 2/2020     What medications do you take or have you taken for your headaches? ABORTIVE:    OTC medications have been ineffective      Methocarbamol once a day in am helps neck pain  Naproxen 550 mg  - 4-5 times a week for headaches        PREVENTIVE:   -         Past:  Gabapentin - did not like the way he felt - 300 mg BID         Alternative therapies used in the past for headaches?  PT     LIFESTYLE  Sleep   - averages: 7 hours  Problems falling asleep?:   Yes  Problems staying asleep?:  Yes        Water: 4 bottles per day  Caffeine: 1-2 cups per day     Mood:   Denies history of anxiety or depression or other diagnosed mood disorder           The following portions of the patient's history were reviewed in the system and updated as appropriate: allergies, current medications, past family history, past medical history, past social history, past surgical history and problem list      Pertinent family history:  [] Migraines  [] Learning disability (ADHD, dyslexia)   [] Psych disorder (depression, anxiety)  * none of the above     Pertinent social history:  Work:   Education: 12th  Lives with wife     Illicit Drugs: denies  Alcohol/tobacco: working on [id number] tobacco, no alcohol      Past Medical History:      - Any history of prior Concussion? no      Past Medical History:     Past Medical History:   Diagnosis Date    Asthma        Patient Active Problem List   Diagnosis    Traumatic closed fracture of C6 vertebra with minimal displacement (HCC)       Medications:      Current Outpatient Medications   Medication Sig Dispense Refill    acetaminophen (TYLENOL) 325 mg tablet Take 3 tablets (975 mg total) by mouth every 8 (eight) hours (Patient taking differently: Take 975 mg by mouth every 8 (eight) hours as needed ) 30 tablet 0    methocarbamol (ROBAXIN) 750 mg tablet Take 1 tablet (750 mg total) by mouth 2 (two) times a day as needed for muscle spasms 60 tablet 0    multivitamin (THERAGRAN) TABS Take 1 tablet by mouth daily      prochlorperazine (COMPAZINE) 10 mg tablet Take 1 tablet (10 mg total) by mouth every 6 (six) hours as needed for nausea or vomiting 12 tablet 3    naproxen sodium (ANAPROX) 550 mg tablet Take 550 mg by mouth 2 (two) times a day with meals       No current facility-administered medications for this visit           Allergies:    No Known Allergies    Family History:     Family History   Problem Relation Age of Onset    No Known Problems Mother     No Known Problems Father        Social History:     Social History     Socioeconomic History    Marital status: /Civil Union     Spouse name: Not on file    Number of children: Not on file   Sonu Years of education: Not on file    Highest education level: Not on file   Occupational History    Not on file   Social Needs    Financial resource strain: Not on file    Food insecurity:     Worry: Not on file     Inability: Not on file    Transportation needs:     Medical: Not on file     Non-medical: Not on file   Tobacco Use    Smoking status: Current Every Day Smoker    Smokeless tobacco: Never Used   Substance and Sexual Activity    Alcohol use: Not Currently    Drug use: Never    Sexual activity: Not on file   Lifestyle    Physical activity:     Days per week: Not on file     Minutes per session: Not on file    Stress: Not on file   Relationships    Social connections:     Talks on phone: Not on file     Gets together: Not on file     Attends Jehov[employer]'s witness service: Not on file     Active member of club or organization: Not on file     Attends meetings of clubs or organizations: Not on file     Relationship status: Not on file    Intimate partner violence:     Fear of current or ex partner: Not on file     Emotionally abused: Not on file     Physically abused: Not on file     Forced sexual activity: Not on file   Other Topics Concern    Not on file   Social History Narrative    Not on file         Objective:         Physical Exam:                                                                 Vitals:            Constitutional:    /80 (BP Location: Left arm, Patient Position: Sitting, Cuff Size: Standard)   Pulse 90   Temp 97 9 °F (36 6 °C) (Tympanic)   Ht 6' (1 829 m)   Wt 95 9 kg (211 lb 6 4 oz)   BMI 28 67 kg/m²   BP Readings from Last 3 Encounters:   07/17/20 112/80   03/24/20 104/80   01/13/20 104/72     Pulse Readings from Last 3 Encounters:   07/17/20 90   03/24/20 80   01/13/20 88         Well developed, well nourished, well groomed  No dysmorphic features  HEENT:  Normocephalic atraumatic  See neuro exam   Chest:  Respirations regular and unlabored      Cardiovascular: Regular rate, no observed significant swelling  Musculoskeletal:  Full range of motion  (see below under neurologic exam for evaluation of motor function and gait)   Skin:  warm and dry, not diaphoretic  No apparent birthmarks or stigmata of neurocutaneous disease  Psychiatric:  Normal behavior and appropriate affect        Neurological Examination:     Mental status/cognitive function:   Recent and remote memory intact  Attention span and concentration as well as fund of knowledge are appropriate for age  Normal language and spontaneous speech  Cranial Nerves:  III, IV, VI-Pupils were equal, round  Extraocular movements were full and conjugate   VII-facial expression symmetric  VIII-hearing grossly intact bilaterally   Motor Exam: symmetric bulk throughout  no atrophy, fasciculations or abnormal movements noted  Coordination:  no apparent dysmetria, ataxia or tremor noted  Gait: steady casual gait       Pertinent lab results:   09/01/2019:  CMP and CBC unremarkable     Imaging:   I have personally reviewed imaging and radiology read   - MRI Brain without contrast was normal outside our system in 2/2020  - also had and MRI C spine   - CTA head and neck with without contrast 09/02/2019  1   No acute intracranial hemorrhage, midline shift, or mass effect     2   No acute vascular injury identified within the arteries of the neck  3   No high-grade proximal stenosis of the visualized [place]  4   No significant intracranial arteriovenous malformation or aneurysm  5   Redemonstrated displaced fracture of the C6 facet on the left with stable grade 1 anterolisthesis of C6 on C7   Further evaluation with MRI may be obtained if there is concern for ligamentous injury  Review of Systems:   ROS obtained by medical assistant Personally reviewed and updated if indicated  Review of Systems   Constitutional: Negative  Negative for appetite change and fever  HENT: Negative    Negative for hearing loss, tinnitus, trouble swallowing and voice change  Eyes: Negative  Negative for photophobia and pain  Respiratory: Negative  Negative for shortness of breath  Cardiovascular: Negative  Negative for palpitations  Gastrointestinal: Negative  Negative for nausea and vomiting  Endocrine: Negative  Negative for cold intolerance  Genitourinary: Negative  Negative for dysuria, frequency and urgency  Musculoskeletal: Negative  Negative for myalgias and neck pain  Skin: Negative  Negative for rash  Neurological: Negative  Negative for dizziness, tremors, seizures, syncope, facial asymmetry, speech difficulty, weakness, light-headedness, numbness and headaches  Hematological: Negative  Does not bruise/bleed easily  Psychiatric/Behavioral: Negative  Negative for confusion, hallucinations and sleep disturbance  I have spent 25 minutes with Patient  today in which greater than 50% of this time was spent in counseling/coordination of care regarding Prognosis, Risks and benefits of tx options, Intructions for management, Patient  education, Importance of tx compliance, Risk factor reductions and Impressions        Author:  Eric Painter MD 7/17/2020 3:52 PM

## 2020-07-17 NOTE — TELEPHONE ENCOUNTER
Patient called to request sooner appt with Hue Valdes  Not doing well with headaches  Reporting headaches increasing about 4 times per week  Varying locations of pain  (Switching between temples and back of head) Associated symptoms of nausea  Notified Gwen Handler and [address]

## 2020-07-17 NOTE — PATIENT INSTRUCTIONS
EKG ordered   Agree with PT for neck     Bring MRI Brain images next visit and radiology report      Headache/migraine treatment:   Abortive medications (for immediate treatment of a headache): Ok to take ibuprofen or acetaminophen for headaches, but try to limit the amount and frequency that you are taking to avoid medication overuse/rebound headache  Ideally no more than 3 days per week      prochlorperazine/Compazine which is technically a nausea medication but it can be used for headaches with and without nausea     Prescription preventive medications for headaches/migraines   (to take every day to help prevent headaches - not to take at the time of headache):  Amytriptyline start 10mg at bedtime  Increase by 10mg each week until good effect on headaches/pain or reach 50mg daily      *Typically these types of medications take time untill you see the benefit, although some may see improvement in days, often it may take weeks, especially if the medication is being titrated up to a beneficial level  Please contact us if there are any concerns or questions regarding the medication       Sleep/headache prevention:  -  Melatonin - you may take 3 mg nightly for sleep  You should take this 1 hour prior to bedtime consistently every night for it to work  It works by gradually helping to adjust your sleep time over days to weeks, rather than immediately making you feel sleepy        Lifestyle Recommendations:  - Maintain good sleep hygiene  Going to bed and waking up at consistent times, avoiding excessive daytime naps, avoiding caffeinated beverages in the evening, avoid excessive stimulation in the evening and generally using bed primarily for sleeping  One hour before bedtime would recommend turning lights down lower, decreasing your activity (may read quietly, listen to music at a low volume)   When you get into bed, should eliminate all technology (no texting, emailing, playing with your phone, iPad or tablet in bed)   - Maintain good hydration  Drink  2L of fluid a day (4 typical small water bottles)  - Maintain good nutrition  In particular don't skip meals and eat balanced meals regularly   - TOBACCO CESSATION: Eric[place]on [place] was educated regarding the impact of smoking and advised to quit  Willingness to quit was assessed and he is not willing at this time  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, [place]  [employer]          Education and Follow-up  - Please contact us if any questions or concerns arise  Of course, try to protect yourself from head injuries, and if any new concerning symptoms or significant blow to the head or body go to the emergency department    - Follow up 3 months, sooner if needed

## 2020-07-21 ENCOUNTER — EVALUATION (OUTPATIENT)
Dept: PHYSICAL THERAPY | Facility: CLINIC | Age: 45
End: 2020-07-21
Payer: OTHER MISCELLANEOUS

## 2020-07-21 DIAGNOSIS — R42 VERTIGO: ICD-10-CM

## 2020-07-21 DIAGNOSIS — S12.500D CLOSED TRAUMATIC MINIMALLY DISPLACED FRACTURE OF SIXTH CERVICAL VERTEBRA WITH ROUTINE HEALING, SUBSEQUENT ENCOUNTER: Primary | ICD-10-CM

## 2020-07-21 PROCEDURE — 97112 NEUROMUSCULAR REEDUCATION: CPT | Performed by: PHYSICAL THERAPIST

## 2020-07-21 PROCEDURE — 97110 THERAPEUTIC EXERCISES: CPT | Performed by: PHYSICAL THERAPIST

## 2020-07-21 PROCEDURE — 97140 MANUAL THERAPY 1/> REGIONS: CPT | Performed by: PHYSICAL THERAPIST

## 2020-07-21 PROCEDURE — 97530 THERAPEUTIC ACTIVITIES: CPT | Performed by: PHYSICAL THERAPIST

## 2020-07-21 NOTE — PROGRESS NOTES
PT Re-Evaluation    Today's date: 2020  Patient name: Catherine Ness  : 1975  MRN: [id number]  Referring provider: Javon Joaquin*  Dx:   Encounter Diagnosis     ICD-10-CM    1  Closed traumatic minimally displaced fracture of sixth cervical vertebra with routine healing, subsequent encounter S12 500D    2  Vertigo R42            Subjective Evaluation     History of Present Illness  He states he saw MD who stated he can be on sedentary restrictions  He states he feels 75% improvement c his mobility  He still has difficulty c turning the head too quickly  He feels the vertigo and dizziness is occasionally coming on,  seeming to get better  His sxs come on 1x/week  HA occurrence is still the same as before 3-4x/ week  He feels nothing seems to help c HA  He is trialing new medication to help this  He does get 1 day soreness after PT sessions  Hx of injury:  Patient presents with c/o On 2019, he was working pushing stacked pallets when they accidentally fell hitting him on top of the head  He had went to work the next few days bent over with significant pain  He had neck collar unitl January and then Therapy until March and then his MD wanted him to come in network  He had a left C6 facet fracture which showed healing on x-ray in January  He is seeing a Neurologist for the HA  He does get vertigo when looking up  He still complains of cramps and HA about 2-3x/week  It gets to be pounding and he gets nausea  He has loss of mobility and wants his strength to get better  He is allowed to only lift 10-15#        Neuro signs: He does get dizziness, nausea, HA- blurry vision, ringing in his ears    Red flags: none  Occupation: commercial rene       Pain  At best pain ratin  At worst pain ratin  Location: T1-2 L TP  Quality: feels off, clicking  Relieving factors: rubbing forehead, ball rolls of neck side to side  Aggravating factors: lifting garbage bags, waking up with it and doesn't go away looking down- has trouble breathing, looking up, middle of the day    Social Support  Lives with his family at home  Patient Goals  Patient goals for therapy: to regain mobility, get back to normal, lifting #150 with another person     STGs  1  Decrease pain by 20% in 2-4 weeks  - met  2  Improve cervical ROM by 10 degrees in 2-4 weeks  - partially met  3  Improve shoulder strength by 1/3 grade in 2-4 weeks  - met  4  Looking up s dizziness in 4 weeks  - met  5  Smoking 6-7 cigaretes/ day - met    LTGs  1  Decrease pain by 60% in 6-8 weeks  2  Improve reaching tolerance overhead of > #50 6-8 weeks  3  Perform job activities without pain in 6-8 weeks  4  Smoking 3-5 cigarretes/day in 8 weeks  - not met    Objective Measurements:    Observation: thoracic flex  Sensation:WFL  Reflexes:WFL except tricep B 1+  R bicep 0    Myotomes:  L wrist flex 4+/5   Ocular ROM: H WNL    Alejo hallpike: slight dizziness to R  Epleys: R - but dizziness in transitional movements  VOR: -  2 object speed: WFL although caused dizziness       Scap assist:NT    DEANDRE: PROM WFL, L rib 1 WFL  Palpation:suboccipitals TTP  Overhead lift single arm #5 could possible do more ROM  DNF endurance: 21 33 sec shakiness  Cervical ROM L R Strength L R   Flex   40p   Mid trap 4+ 4    Ext  48   Low trap 4+ 4   Rotation 47p 50p      SB Min limited Max limited      Repeated retraction/flex          Shoulder A/PROM        Flex  147/ / Flex     Abd 172 /WFL  160 / Abd     ER 80/ 80 / ER 4+ 4+   IR scratch T9 / T9 / IR 4+ 4+   ER scratch T5 /  T5/                      Assessment:  Ilan Funes has demonstrated overall improvement in shoulder ROM, scapular/shoulder strength, and function  Currently, he has made steady progress towards his goals, but continues to remain limited with R scapular strength decreased AROM of cervical spine, decreased trunk activation, poor biomechanics,  and dizziness c eye ROM   We have not been able to assist c HA but will keep an eye on effect of new medication  At this time, skilled physical therapy is warranted to address the remaining impairments and aide in return to independent HEP,return to work, and lifting activities at home  He is able to lift #25 box lifts but he needs cueing for form, trunk activation and postural tolerance  He states he still has difficulty swallowing perhaps due to C6 on C7 anteriorlisthesis and will reach out to our speech department to see if potential evaluation would be advised  Limiting factors to therapy smoking and he demonstrates good motivation and compliance c HEP  Plan  Patient would benefit from:Skilled physical therapy  Planned therapy interventions: manual therapy, neuromuscular re-education, stretching, strengthening, therapeutic activities, therapeutic exercise, patient education, home exercise program, and activity modification  Frequency: 2x week  Duration in weeks: 6-8  Treatment plan discussed with: patient           Goals: Patient's goal is to regain mobility, get back to normal, lifting #150 with another person       Precautions: Asthma, C6 anteriorlisthesis- stable, fx C6 T TP 8/2019, concussive sxs  Dx:L cervical R sb ROM deficits- L shoulder/UE weakness>R  Vertigo    Daily Treatment Diary   Manuals 7/16 7/21 06/18 06/23 6/29 07/07 7/9 7/14   STM CR  10 mins          C-PROM CR  8 mins 6' hold nv LNK LNK 8' LNK 8' 8   SNAG rotation B    nv         L rotation c L rib 1 inf glide NP  [place] 2'  [address] [place]   Ther Ex           Pulleys   3'  3'      Lev scap st  10: x3         SA Alphabet B 2#  x1  1x 1#  1x 2# 1x #2 2# 1x  2# x1 2# x 1   Seated R SB AROM 10 10x 10x 10x 10x 10x 10x 10x   Thoracic ext over chair 10"x10 10x :10 10x10: 10x10: 10x :10 10x :10 10x10" 10" x 10    Seated L rotation AROM 10 10x 10x 10x 10x 10x 10x 10x   UBE 3'/3' 3/3 2'/2' 3'/3' 3/3 3'/3' 3'/3' 3/3   Wall walks RTB  5 laps         Flex/abd AROM c #2-3 2#  2x10 ea   nv 10x 2#  2x10 2#  2x 10 #2 2x10 2# 2x10 2# 2# 12            0# 2 x10                         RTB bicep/tricep GTB  3x10 nv GTB  2x10  GTB  2x10 GTB 2x10  GTB 2x10 GTB 2 x 10    L C-rotation snag c L rib 1 inf mob           Neuro Re-ed           DNF supine lifts 3"x20 20x :05 10x 3" 10x3" 20x :03 20x :03  :03 x 20    Biodex LOS  L7  x2       LOS lvl 6 x 2    VOR- 1 object head turns   20 20  20     VOR 2 object no head turns   20 20  20                           TA c LTR           TA contraction  20x :05      5 laps 3 laps - VC's   Ther Activity           Box lifts c TA contraction floor to chair  #25 watch form                               Gait Training                                 Modalities           MHP 10 mins np today  10' declined 10' Declined declined

## 2020-07-23 ENCOUNTER — OFFICE VISIT (OUTPATIENT)
Dept: PHYSICAL THERAPY | Facility: CLINIC | Age: 45
End: 2020-07-23
Payer: OTHER MISCELLANEOUS

## 2020-07-23 DIAGNOSIS — R42 VERTIGO: ICD-10-CM

## 2020-07-23 DIAGNOSIS — S12.500D CLOSED TRAUMATIC MINIMALLY DISPLACED FRACTURE OF SIXTH CERVICAL VERTEBRA WITH ROUTINE HEALING, SUBSEQUENT ENCOUNTER: Primary | ICD-10-CM

## 2020-07-23 PROCEDURE — 97530 THERAPEUTIC ACTIVITIES: CPT

## 2020-07-23 PROCEDURE — 97140 MANUAL THERAPY 1/> REGIONS: CPT

## 2020-07-23 PROCEDURE — 97110 THERAPEUTIC EXERCISES: CPT

## 2020-07-23 NOTE — PROGRESS NOTES
Daily Note     Today's date: 2020  Patient name: Wyatt Goldmann  : 1975  MRN: [id number]  Referring provider: Jackie Martin*  Dx:   Encounter Diagnosis     ICD-10-CM    1  Closed traumatic minimally displaced fracture of sixth cervical vertebra with routine healing, subsequent encounter S12 500D    2  Vertigo R42            1:1 with PTA CR 3:30- 4:15  Unbilled 4:15-4;30  Subjective: Back spasms after performing 25# box lift last session  Objective: See treatment diary below      Assessment: Tolerated treatment fair   Reduced weight to 15# with focus on proper technique  Struggles to lift with legs and not his back  Significant cueing for improved TA and glute recruitment  Patient demonstrated fatigue post treatment and would benefit from continued PT      Plan: Continue per plan of care  Goals: Patient's goal is to regain mobility, get back to normal, lifting #150 with another person       Precautions: Asthma, C6 anteriorlisthesis- stable, fx C6 T TP 2019, concussive sxs  Dx:L cervical R sb ROM deficits- L shoulder/UE weakness>R  Vertigo    Daily Treatment Diary   Manuals    STM CR  10 mins  CR  10 mins        C-PROM CR  8 mins 6' hold nv CR  5 mins LNK 8' LNK 8' 8   SNAG rotation B    nv NV        L rotation c L rib 1 inf glide NP   WA 2'  [address] [place]   Ther Ex           Pulleys     3'      Lev scap st  10: x3 10"x3        SA Alphabet B 2#  x1   1x 2# 1x #2 2# 1x  2# x1 2# x 1   Seated R SB AROM 10 10x 10 10x 10x 10x 10x 10x   Thoracic ext over chair 10"x10 10x :10 10"x10 10x10: 10x :10 10x :10 10x10" 10" x 10    Seated L rotation AROM 10 10x 10 10x 10x 10x 10x 10x   UBE 3'/3' 3/3 3'/3' 3'/3' 3/3 3'/3' 3'/3' 3/3   Wall walks RTB  5 laps 5 laps        Flex/abd AROM c #2-3 2#  2x10 ea   nv NV 2x10 2#  2x 10 #2 2x10 2# 2x10 2# 2# 12            0# 2 x10                         RTB bicep/tricep GTB  3x10 nv GTB  3x10 GTB  2x10 GTB 2x10  GTB 2x10 GTB 2 x 10    L C-rotation snag c L rib 1 inf mob           Neuro Re-ed           DNF supine lifts 3"x20 20x :05 5"x20 10x3" 20x :03 20x :03  :03 x 20    Biodex LOS  L7  x2       LOS lvl 6 x 2    VOR- 1 object head turns    20  20     VOR 2 object no head turns    20  20                           TA c LTR           TA contraction  20x :05 5"x20     5 laps 3 laps - VC's   Ther Activity           Box lifts c TA contraction floor to chair  #25 watch form 15#  x10  Sig cues                              Gait Training                                 Modalities           MHP 10 mins np today declined 10' declined 10' Declined declined

## 2020-07-28 ENCOUNTER — APPOINTMENT (OUTPATIENT)
Dept: PHYSICAL THERAPY | Facility: CLINIC | Age: 45
End: 2020-07-28
Payer: OTHER MISCELLANEOUS

## 2020-07-28 NOTE — PROGRESS NOTES
Daily Note     Today's date: 2020  Patient name: Nikolas Suarez  : 1975  MRN: [id number]  Referring provider: Dejon Benito  Dx:   Encounter Diagnosis     ICD-10-CM    1  Closed traumatic minimally displaced fracture of sixth cervical vertebra with routine healing, subsequent encounter S12 500D    2  Vertigo R42            1:1 with PTA CR 3:30- 4:15  Unbilled 4:15-4;30  Subjective: Back spasms after performing 25# box lift last session  Objective: See treatment diary below      Assessment: Tolerated treatment fair   Reduced weight to 15# with focus on proper technique  Struggles to lift with legs and not his back  Significant cueing for improved TA and glute recruitment  Patient demonstrated fatigue post treatment and would benefit from continued PT      Plan: Continue per plan of care  Goals: Patient's goal is to regain mobility, get back to normal, lifting #150 with another person       Precautions: Asthma, C6 anteriorlisthesis- stable, fx C6 T TP 2019, concussive sxs  Dx:L cervical R sb ROM deficits- L shoulder/UE weakness>R  Vertigo    Daily Treatment Diary   Manuals    STM CR  10 mins  CR  10 mins        C-PROM CR  8 mins 6' hold nv CR  5 mins   LNK 8' 8   SNAG rotation B    nv NV        L rotation c L rib 1 inf glide NP      LH [place]   Ther Ex           Pulleys           Lev scap st  10: x3 10"x3        SA Alphabet B 2#  x1     2# 1x  2# x1 2# x 1   Seated R SB AROM 10 10x 10   10x 10x 10x   Thoracic ext over chair 10"x10 10x :10 10"x10   10x :10 10x10" 10" x 10    Seated L rotation AROM 10 10x 10   10x 10x 10x   UBE 3'/3' 3/3 3'/3'   3'/3' 3'/3' 3/3   Wall walks RTB  5 laps 5 laps        Flex/abd AROM c #2-3 2#  2x10 ea   nv NV   2x10 2# 2x10 2# 2# 12            0# 2 x10                         RTB bicep/tricep GTB  3x10 nv GTB  3x10    GTB 2x10 GTB 2 x 10    L C-rotation snag c L rib 1 inf mob           Neuro Re-ed           DNF supine lifts 3"x20 20x :05 5"x20   20x :03  :03 x 20    Biodex LOS  L7  x2       LOS lvl 6 x 2    VOR- 1 object head turns      20     VOR 2 object no head turns      20                           TA c LTR           TA contraction  20x :05 5"x20     5 laps 3 laps - VC's   Ther Activity           Box lifts c TA contraction floor to chair  #25 watch form 15#  x10  Sig cues                              Gait Training                                 Modalities           MHP 10 mins np today declined   10' Declined declined

## 2020-07-30 ENCOUNTER — OFFICE VISIT (OUTPATIENT)
Dept: PHYSICAL THERAPY | Facility: CLINIC | Age: 45
End: 2020-07-30
Payer: OTHER MISCELLANEOUS

## 2020-07-30 ENCOUNTER — OFFICE VISIT (OUTPATIENT)
Dept: LAB | Facility: CLINIC | Age: 45
End: 2020-07-30
Payer: COMMERCIAL

## 2020-07-30 DIAGNOSIS — R42 VERTIGO: ICD-10-CM

## 2020-07-30 DIAGNOSIS — G44.329 CHRONIC POST-TRAUMATIC HEADACHE, NOT INTRACTABLE: ICD-10-CM

## 2020-07-30 DIAGNOSIS — S12.500D CLOSED TRAUMATIC MINIMALLY DISPLACED FRACTURE OF SIXTH CERVICAL VERTEBRA WITH ROUTINE HEALING, SUBSEQUENT ENCOUNTER: Primary | ICD-10-CM

## 2020-07-30 PROCEDURE — 97530 THERAPEUTIC ACTIVITIES: CPT

## 2020-07-30 PROCEDURE — 97112 NEUROMUSCULAR REEDUCATION: CPT

## 2020-07-30 PROCEDURE — 97110 THERAPEUTIC EXERCISES: CPT

## 2020-07-30 PROCEDURE — 93005 ELECTROCARDIOGRAM TRACING: CPT

## 2020-07-30 NOTE — PROGRESS NOTES
Daily Note     Today's date: 2020  Patient name: Kesha Restrepo  : 1975  MRN: [id number]  Referring provider: Amy Shell*  Dx:   Encounter Diagnosis     ICD-10-CM    1  Closed traumatic minimally displaced fracture of sixth cervical vertebra with routine healing, subsequent encounter S12 500D    2  Vertigo R42        Start Time: 6177  Stop Time: 1615  Total time in clinic (min): 60 minutes    Subjective: "Many years ago a herniated a disc and so I got away from squatting"       Objective: See treatment diary below  Added TG squats to work towards pt goal of weighted box lifts  Assessment: Tolerated treatment fair Poor technique with box lifts this session, trialed squatting and pt was unable to achieve depth necessary for floor to standing box lift this session  Patient demonstrated fatigue post treatment and would benefit from continued PT      Plan: Continue per plan of care  Goals: Patient's goal is to regain mobility, get back to normal, lifting #150 with another person       Precautions: Asthma, C6 anteriorlisthesis- stable, fx C6 T TP 2019, concussive sxs  Dx:L cervical R sb ROM deficits- L shoulder/UE weakness>R  Vertigo    Daily Treatment Diary   Manuals    STM CR  10 mins  CR  10 mins LNK 8'        C-PROM CR  8 mins 6' hold nv CR  5 mins np  LNK 8' 8   SNAG rotation B    nv NV TS       L rotation c L rib 1 inf glide NP      LH [place]   Ther Ex           Pulleys           Lev scap st  10: x3 10"x3 np       SA Alphabet B 2#  x1     2# 1x  2# x1 2# x 1   Seated R SB AROM 10 10x 10 10  10x 10x 10x   Thoracic ext over chair 10"x10 10x :10 10"x10 10x10"  10x :10 10x10" 10" x 10    Seated L rotation AROM 10 10x 10 10  10x 10x 10x   UBE 3'/3' 3/3 3'/3' 3'/3'  3'/3' 3'/3' 3/3   Wall walks RTB  5 laps 5 laps 5 laps        Flex/abd AROM c #2-3 2#  2x10 ea   nv NV 2# 2x10   2x10 2# 2x10 2# 2# 12            0# 2 x10                         RTB bicep/tricep GTB  3x10 nv GTB  3x10 GTB  3x10    GTB 2x10 GTB 2 x 10    L C-rotation snag c L rib 1 inf mob           Neuro Re-ed           DNF supine lifts 3"x20 20x :05 5"x20 5"x20  20x :03  :03 x 20    Biodex LOS  L7  x2       LOS lvl 6 x 2    VOR- 1 object head turns      20     VOR 2 object no head turns      20                           TA c LTR           TA contraction  20x :05 5"x20 5"x20     5 laps 3 laps - VC's   Ther Activity           Box lifts c TA contraction floor to chair  #25 watch form 15#  x10  Sig cues 20# chair to standing       TG Squatting     30 L 22                  Gait Training                                 Modalities           MHP 10 mins np today declined 10'  10' Declined declined

## 2020-07-31 LAB
ATRIAL RATE: 79 BPM
P AXIS: 72 DEGREES
PR INTERVAL: 168 MS
QRS AXIS: -47 DEGREES
QRSD INTERVAL: 94 MS
QT INTERVAL: 384 MS
QTC INTERVAL: 440 MS
T WAVE AXIS: 48 DEGREES
VENTRICULAR RATE: 79 BPM

## 2020-07-31 PROCEDURE — 93010 ELECTROCARDIOGRAM REPORT: CPT | Performed by: INTERNAL MEDICINE

## 2020-08-04 ENCOUNTER — APPOINTMENT (OUTPATIENT)
Dept: PHYSICAL THERAPY | Facility: CLINIC | Age: 45
End: 2020-08-04
Payer: OTHER MISCELLANEOUS

## 2020-08-06 ENCOUNTER — OFFICE VISIT (OUTPATIENT)
Dept: PHYSICAL THERAPY | Facility: CLINIC | Age: 45
End: 2020-08-06
Payer: OTHER MISCELLANEOUS

## 2020-08-06 DIAGNOSIS — R42 VERTIGO: ICD-10-CM

## 2020-08-06 DIAGNOSIS — S12.500D CLOSED TRAUMATIC MINIMALLY DISPLACED FRACTURE OF SIXTH CERVICAL VERTEBRA WITH ROUTINE HEALING, SUBSEQUENT ENCOUNTER: Primary | ICD-10-CM

## 2020-08-06 PROCEDURE — 97112 NEUROMUSCULAR REEDUCATION: CPT

## 2020-08-06 PROCEDURE — 97140 MANUAL THERAPY 1/> REGIONS: CPT

## 2020-08-06 PROCEDURE — 97110 THERAPEUTIC EXERCISES: CPT

## 2020-08-06 PROCEDURE — 97530 THERAPEUTIC ACTIVITIES: CPT

## 2020-08-06 NOTE — PROGRESS NOTES
Daily Note     Today's date: 2020  Patient name: Odilia Schaumann  : 1975  MRN: [id number]  Referring provider: Jordan Gaitan*  Dx:   Encounter Diagnosis     ICD-10-CM    1  Closed traumatic minimally displaced fracture of sixth cervical vertebra with routine healing, subsequent encounter  S12 500D    2  Vertigo  R42                   Subjective: Patient states that he continues to suffer from 2-3 debilitating migraines throughout the week   Neurologist recently prescribed medication for HA however patient has not noticed a significant change yet  Objective: See treatment diary below      Assessment: Tolerated treatment well  Patient exhibited good technique with therapeutic exercises      Plan: Continue per plan of care  Goals: Patient's goal is to regain mobility, get back to normal, lifting #150 with another person       Precautions: Asthma, C6 anteriorlisthesis- stable, fx C6 T TP 2019, concussive sxs  Dx:L cervical R sb ROM deficits- L shoulder/UE weakness>R  Vertigo    Daily Treatment Diary   Manuals       STM CR  10 mins  CR  10 mins LNK 8'  8 min       C-PROM CR  8 mins 6' hold nv CR  5 mins np np      SNAG rotation B    nv NV TS TS      L rotation c L rib 1 inf glide NP          Ther Ex           Pulleys           Lev scap st  10: x3 10"x3 np       SA Alphabet B 2#  x1          Seated R SB AROM 10 10x 10 10 10      Thoracic ext over chair 10"x10 10x :10 10"x10 10x10" :10x10       Seated L rotation AROM 10 10x 10 10 10       UBE 3'/3' 3/3 3'/3' 3'/3' 3/3      Wall walks RTB  5 laps 5 laps 5 laps  5 laps       Flex/abd AROM c #2-3 2#  2x10 ea   nv NV 2# 2x10  3# 2 x 10                                        RTB bicep/tricep GTB  3x10 nv GTB  3x10 GTB  3x10  GTB 3 x 10       L C-rotation snag c L rib 1 inf mob           Neuro Re-ed           DNF supine lifts 3"x20 20x :05 5"x20 5"x20 5" x 20       Biodex LOS  L7  x2          VOR- 1 object head turns VOR 2 object no head turns                                 TA c LTR           TA contraction  20x :05 5"x20 5"x20  5" x 20       Ther Activity           Box lifts c TA contraction floor to chair  #25 watch form 15#  x10  Sig cues 20# chair to standing 20# (total) x 10      TG Squatting     30 L 22 L 22 x 30                  Gait Training                                 Modalities           MHP 10 mins np today declined 10' 10

## 2020-08-11 ENCOUNTER — OFFICE VISIT (OUTPATIENT)
Dept: PHYSICAL THERAPY | Facility: CLINIC | Age: 45
End: 2020-08-11
Payer: OTHER MISCELLANEOUS

## 2020-08-11 DIAGNOSIS — R42 VERTIGO: ICD-10-CM

## 2020-08-11 DIAGNOSIS — S12.500D CLOSED TRAUMATIC MINIMALLY DISPLACED FRACTURE OF SIXTH CERVICAL VERTEBRA WITH ROUTINE HEALING, SUBSEQUENT ENCOUNTER: Primary | ICD-10-CM

## 2020-08-11 PROCEDURE — 97112 NEUROMUSCULAR REEDUCATION: CPT

## 2020-08-11 PROCEDURE — 97110 THERAPEUTIC EXERCISES: CPT

## 2020-08-11 PROCEDURE — 97140 MANUAL THERAPY 1/> REGIONS: CPT

## 2020-08-11 NOTE — PROGRESS NOTES
Daily Note     Today's date: 2020  Patient name: Jud Betancourt  : 1975  MRN: [id number]  Referring provider: Jorge Flores*  Dx:   Encounter Diagnosis     ICD-10-CM    1  Closed traumatic minimally displaced fracture of sixth cervical vertebra with routine healing, subsequent encounter  S12 500D    2  Vertigo  R42                   Subjective: Patient states that he has been experiencing a severe HA over the last 2 days  States that he is experiencing nausea and dizziness due to this  Patient has noticed that bending forward/lifitng has increased the intensity of his HA's historically  Held some exercises today due to this and will resime at nv    Objective: See treatment diary below      Assessment: Tolerated treatment fair  Patient noticed an increase in dizziness with positional changes  Plan: Continue per plan of care  Goals: Patient's goal is to regain mobility, get back to normal, lifting #150 with another person       Precautions: Asthma, C6 anteriorlisthesis- stable, fx C6 T TP 2019, concussive sxs  Dx:L cervical R sb ROM deficits- L shoulder/UE weakness>R  Vertigo    Daily Treatment Diary   Manuals      STM CR  10 mins  CR  10 mins LNK 8'  8 min  8 min      C-PROM CR  8 mins 6' hold nv CR  5 mins np np      SNAG rotation B    nv NV TS TS RK, DPT      L rotation c L rib 1 inf glide NP          Ther Ex           Pulleys           Lev scap st  10: x3 10"x3 np       SA Alphabet B 2#  x1          Seated R SB AROM 10 10x 10 10 10 10     Thoracic ext over chair 10"x10 10x :10 10"x10 10x10" :10x10  :10x10      Seated L rotation AROM 10 10x 10 10 10  10     UBE 3'/3' 3/3 3'/3' 3'/3' 3/3 3/3     Wall walks RTB  5 laps 5 laps 5 laps  5 laps  5 laps      Flex/abd AROM c #2-3 2#  2x10 ea   nv NV 2# 2x10  3# 2 x 10  3# 2 x 10                                      RTB bicep/tricep GTB  3x10 nv GTB  3x10 GTB  3x10  GTB 3 x 10  GTB 3 x 10      L C-rotation snag c L rib 1 inf mob           Neuro Re-ed           DNF supine lifts 3"x20 20x :05 5"x20 5"x20 5" x 20  Held - migraine     Biodex LOS  L7  x2          VOR- 1 object head turns           VOR 2 object no head turns                                 TA c LTR           TA contraction  20x :05 5"x20 5"x20  5" x 20  5" x 20      Ther Activity           Box lifts c TA contraction floor to chair  #25 watch form 15#  x10  Sig cues 20# chair to standing 20# (total) x 10 NV     TG Squatting     30 L 22 L 22 x 30  L22 x 30                 Gait Training                                 Modalities           MHP 10 mins np today declined 10' 10 declined

## 2020-08-13 ENCOUNTER — APPOINTMENT (OUTPATIENT)
Dept: PHYSICAL THERAPY | Facility: CLINIC | Age: 45
End: 2020-08-13
Payer: OTHER MISCELLANEOUS

## 2020-08-18 ENCOUNTER — OFFICE VISIT (OUTPATIENT)
Dept: PHYSICAL THERAPY | Facility: CLINIC | Age: 45
End: 2020-08-18
Payer: OTHER MISCELLANEOUS

## 2020-08-18 DIAGNOSIS — R42 VERTIGO: ICD-10-CM

## 2020-08-18 DIAGNOSIS — S12.500D CLOSED TRAUMATIC MINIMALLY DISPLACED FRACTURE OF SIXTH CERVICAL VERTEBRA WITH ROUTINE HEALING, SUBSEQUENT ENCOUNTER: Primary | ICD-10-CM

## 2020-08-18 PROCEDURE — 97530 THERAPEUTIC ACTIVITIES: CPT

## 2020-08-18 PROCEDURE — 97110 THERAPEUTIC EXERCISES: CPT

## 2020-08-18 PROCEDURE — 97140 MANUAL THERAPY 1/> REGIONS: CPT

## 2020-08-18 NOTE — PROGRESS NOTES
Daily Note     Today's date: 2020  Patient name: Kesha Restrepo  : 1975  MRN: [id number]  Referring provider: Amy Shell*  Dx:   Encounter Diagnosis     ICD-10-CM    1  Closed traumatic minimally displaced fracture of sixth cervical vertebra with routine healing, subsequent encounter  S12 500D    2  Vertigo  R42        Start Time: 4994  Stop Time:   Total time in clinic (min): 45 minutes    Subjective: Patient reports that he is unable to take his HA medicine due to a heart problem  Is requesting to hold box lifts this session as they typically increase his HA  Objective: See treatment diary below      Assessment: Tolerated treatment fair  L Rotation painful and limited  Patient continues with dizziness during position changes  Plan: Continue per plan of care  Goals: Patient's goal is to regain mobility, get back to normal, lifting #150 with another person       Precautions: Asthma, C6 anteriorlisthesis- stable, fx C6 T TP 2019, concussive sxs  Dx:L cervical R sb ROM deficits- L shoulder/UE weakness>R  Vertigo    Daily Treatment Diary   Manuals     STM CR  10 mins  CR  10 mins LNK 8'  8 min  8 min  8 min     C-PROM CR  8 mins 6' hold nv CR  5 mins np np      SNAG rotation B    nv NV TS TS RK, DPT  RH     L rotation c L rib 1 inf glide NP          Ther Ex           Pulleys           Lev scap st  10: x3 10"x3 np       SA Alphabet B 2#  x1          Seated R SB AROM 10 10x 10 10 10 10 10    Thoracic ext over chair 10"x10 10x :10 10"x10 10x10" :10x10  :10x10  :10x10    Seated L rotation AROM 10 10x 10 10 10  10 10x    UBE 3'/3' 3/3 3'/3' 3'/3' 3/3 3/3 3'/3'    Wall walks RTB  5 laps 5 laps 5 laps  5 laps  5 laps  Green 5 laps     Flex/abd AROM c #2-3 2#  2x10 ea   nv NV 2# 2x10  3# 2 x 10  3# 2 x 10 3# 2x10                                     RTB bicep/tricep GTB  3x10 nv GTB  3x10 GTB  3x10  GTB 3 x 10  GTB 3 x 10  GTB 3x10    L C-rotation snag c L rib 1 inf mob           Neuro Re-ed           DNF supine lifts 3"x20 20x :05 5"x20 5"x20 5" x 20  Held - migraine np    Biodex LOS  L7  x2          VOR- 1 object head turns           VOR 2 object no head turns                                 TA c LTR           TA contraction  20x :05 5"x20 5"x20  5" x 20  5" x 20  5"x20    Ther Activity           Box lifts c TA contraction floor to chair  #25 watch form 15#  x10  Sig cues 20# chair to standing 20# (total) x 10 NV Held HA    TG Squatting     30 L 22 L 22 x 30  L22 x 30  L 22 x30               Gait Training                                 Modalities           MHP 10 mins np today declined 10' 10 declined

## 2020-08-20 ENCOUNTER — APPOINTMENT (OUTPATIENT)
Dept: PHYSICAL THERAPY | Facility: CLINIC | Age: 45
End: 2020-08-20
Payer: OTHER MISCELLANEOUS

## 2020-08-31 ENCOUNTER — TELEPHONE (OUTPATIENT)
Dept: NEUROLOGY | Facility: CLINIC | Age: 45
End: 2020-08-31

## 2020-08-31 NOTE — TELEPHONE ENCOUNTER
Neurology clinical team, Please let patient know that EKG showed findings that may just be the computer missreading the EKG which is very common  I am CCing his PCP to see what she thinks of the EKG  Hold off on the amitriptyline until we have more answers  Dr Hayley Dennis, Often prior to starting a TCA such as amitriptyline, we like to get an EKG to make sure no QTC prolongation  He certainly does not have QTC prolongation, the other remarks on the EKG are of unknown significance- and I know the computer reads are often incorrect and wanted to get your opinion on the EKG?

## 2020-08-31 NOTE — TELEPHONE ENCOUNTER
Pt called and states that he saw Dr Rachel Oneal on 7/17/20 and EKG was ordered  States that he was advised not to take amitriptyline until ekg is reviewed  Results in media      Pls advise if pt can restart amitriptyline            [phone] ok to leave detailed message

## 2020-09-01 NOTE — TELEPHONE ENCOUNTER
Thank you so much Dr Thien Barney! Neurology clinical team, please let patient know okay to start amitriptyline with gradual titration up per instructions for headache prevention

## 2020-09-04 ENCOUNTER — EVALUATION (OUTPATIENT)
Dept: PHYSICAL THERAPY | Facility: CLINIC | Age: 45
End: 2020-09-04
Payer: COMMERCIAL

## 2020-09-04 DIAGNOSIS — R42 VERTIGO: ICD-10-CM

## 2020-09-04 DIAGNOSIS — S12.500D CLOSED TRAUMATIC MINIMALLY DISPLACED FRACTURE OF SIXTH CERVICAL VERTEBRA WITH ROUTINE HEALING, SUBSEQUENT ENCOUNTER: Primary | ICD-10-CM

## 2020-09-04 PROCEDURE — 97530 THERAPEUTIC ACTIVITIES: CPT | Performed by: PHYSICAL THERAPIST

## 2020-09-04 PROCEDURE — 97110 THERAPEUTIC EXERCISES: CPT | Performed by: PHYSICAL THERAPIST

## 2020-09-04 PROCEDURE — 97112 NEUROMUSCULAR REEDUCATION: CPT | Performed by: PHYSICAL THERAPIST

## 2020-09-04 PROCEDURE — 97140 MANUAL THERAPY 1/> REGIONS: CPT | Performed by: PHYSICAL THERAPIST

## 2020-09-04 NOTE — PROGRESS NOTES
PT Re-Evaluation    Today's date: 2020  Patient name: Valentino Pair  : 1975  MRN: [id number]  Referring provider: Lily Morris*  Dx:   Encounter Diagnosis     ICD-10-CM    1  Closed traumatic minimally displaced fracture of sixth cervical vertebra with routine healing, subsequent encounter  S12 500D    2  Vertigo  R42            Subjective Evaluation     History of Present Illness  He states he he is still on sedentary restrictions  He still has difficulty looking to the L He states he feels 80% improvement c his mobility  He feels the vertigo and dizziness is better but once in a while is present  His sxs come on 1x/week  HA occurrence is still the same as before 3-4x/ week  He feels nothing seems to help c CHOWDHURY  He will be going to the MD for a blockage in the heart found on EKG  He is considering if he could get back to his job  Hx of injury:  Patient presents with c/o On 2019, he was working pushing stacked pallets when they accidentally fell hitting him on top of the head  He had went to work the next few days bent over with significant pain  He had neck collar unitl January and then Therapy until March and then his MD wanted him to come in network  He had a left C6 facet fracture which showed healing on x-ray in January  He is seeing a Neurologist for the HA  He does get vertigo when looking up  Neuro signs: He does get dizziness, nausea, HA- blurry vision, ringing in his ears    Red flags: none  Occupation: commercial rene       Pain  At best pain ratin  At worst pain ratin  Location: T1-2 L TP  Quality: feels off, clicking  Relieving factors: rubbing forehead, ball rolls of neck side to side  Aggravating factors: waking up with it and doesn't go away looking down    Social Support  Lives with his family at home  Patient Goals  Patient goals for therapy: to regain mobility, get back to normal, lifting #150 with another person     STGs  1   Decrease pain by 20% in 2-4 weeks  - met  2  Improve cervical ROM by 10 degrees in 2-4 weeks  - partially met  3  Improve shoulder strength by 1/3 grade in 2-4 weeks  - met  4  Looking up s dizziness in 4 weeks  - met  5  Smoking 6-7 cigaretes/ day - met    LTGs  1  Decrease pain by 60% in 6-8 weeks  2  Improve reaching tolerance overhead of > #50 6-8 weeks  3  Perform job activities without pain in 6-8 weeks  - not met  4  Smoking 3-5 cigarretes/day in 8 weeks  -  Met 5    Objective Measurements:    Observation: thoracic flex  Sensation:WFL  Reflexes:WFL except tricep B 1+  R bicep 0    Myotomes:  L wrist flex 4+/5   Ocular ROM: H WNL    Alejo hallpike: slight dizziness to R  Epleys: R - but dizziness in transitional movements  VOR: -  2 object speed: WFL although caused dizziness       Scap assist:NT    DEANDRE: PROM WFL, L rib 1 WFL  Palpation:suboccipitals TTP  Overhead lift single arm #5 could possible do more ROM  DNF endurance: 21 33 sec shakiness  Cervical ROM L R Strength L R   Flex  60   Mid trap 4+ 4+   Ext 50   Low trap 4+ 4+   Rotation 50p 68      SB Min limited Mod limited      Repeated retraction/flex          Shoulder A/PROM        Flex  150/ / Flex     Abd 172 /WFL  168 / Abd     ER 80/ 80 / ER 4+ 4+   IR scratch T9 / T9 / IR 4+ 4+   ER scratch T5 /  T5/                      Assessment:  Ilan Funes has demonstrated overall improvement in shoulder ROM, scapular/shoulder strength, and function  Currently, he has made steady progress towards his goals, but continues to remain limited with R AROM of cervical spine, decreased trunk activation  and dizziness c eye ROM  We have not been able to assist c HA but will follow up c MD and he may benefit from optometrist at this time to assist c dizziness/HA   He would like to continue more work conditioning in a gym facility at this time and he is appropriate for d/c to gym program     Plan  Patient would benefit from:Skilled physical therapy/gym program and we will d/c to HEP at this time  Planned therapy interventions: manual therapy, neuromuscular re-education, stretching, strengthening, therapeutic activities, therapeutic exercise, patient education, home exercise program, and activity modification  Treatment plan discussed with: patient           Goals: Patient's goal is to regain mobility, get back to normal, lifting #150 with another person       Precautions: Asthma, C6 anteriorlisthesis- stable, fx C6 T TP 8/2019, concussive sxs  Dx:L cervical R sb ROM deficits- L shoulder/UE weakness>R  Vertigo  Daily Treatment Diary   Manuals 7/16 7/21 7/23 07/30 8/6 8/11 08/18 9/4   STM CR  10 mins  CR  10 mins LNK 8'  8 min  8 min  8 min  8'   C-PROM CR  8 mins 6' hold nv CR  5 mins np np      SNAG rotation B    nv NV TS TS RK, DPT  RH     L rotation c L rib 1 inf glide NP          Ther Ex           Pulleys           Lev scap st  10: x3 10"x3 np       SA Alphabet B 2#  x1          Seated R SB AROM 10 10x 10 10 10 10 10    Thoracic ext over chair 10"x10 10x :10 10"x10 10x10" :10x10  :10x10  :10x10    Seated L rotation AROM 10 10x 10 10 10  10 10x 10x   UBE 3'/3' 3/3 3'/3' 3'/3' 3/3 3/3 3'/3' 3/3   Wall walks RTB  5 laps 5 laps 5 laps  5 laps  5 laps  Green 5 laps     Flex/abd AROM c #2-3 2#  2x10 ea   nv NV 2# 2x10  3# 2 x 10  3# 2 x 10 3# 2x10                                     RTB bicep/tricep GTB  3x10 nv GTB  3x10 GTB  3x10  GTB 3 x 10  GTB 3 x 10  GTB 3x10 GTB 3x10   L C-rotation snag c L rib 1 inf mob           Neuro Re-ed           DNF supine lifts 3"x20 20x :05 5"x20 5"x20 5" x 20  Held - migraine np    Biodex LOS  L7  x2          VOR- 1 object head turns           VOR 2 object no head turns                                 TA c LTR           TA contraction  20x :05 5"x20 5"x20  5" x 20  5" x 20  5"x20    Ther Activity           Box lifts c TA contraction floor to chair  #25 watch form 15#  x10  Sig cues 20# chair to standing 20# (total) x 10 NV Held HA #35 6x   TG Squatting     30 L 22 L 22 x 30  L22 x 30  L 22 x30               Gait Training                                 Modalities           MHP 10 mins np today declined 10' 10 declined

## 2020-09-09 NOTE — PROGRESS NOTES
Assessment/Plan:    Encounter for medication review  Patient recommended to have EKG prior to initiation of Elavil for headache prevention following traumatic cervical spine injury - EKG performed and chart review revealed anterior fascicular block was likely computer error and patient ok to start Elavil as EKG was only to determine if he had any underlying QTc prolongation  Notified patient he is ok to begin Elavil  Encounter for immunization  Flu shot administered today  At risk for long QT syndrome  Presented for EKG review prior to initiating Elavil  Abnormal finding on EKG  EKG was read as anterior fascicular block but on review was found to have normal QT interval  OK to start Amitriptyline with follow up EKG in 3 months  BMI Counseling: Body mass index is 29 16 kg/m²  The BMI is above normal  Nutrition recommendations include 3-5 servings of fruits/vegetables daily  Exercise recommendations include moderate aerobic physical activity for 150 minutes/week  Diagnoses and all orders for this visit:      Encounter for immunization  -     influenza vaccine, quadrivalent, 0 5 mL, preservative-free, for adult and pediatric patients 6 mos+ (AFLURIA, FLUARIX, FLULAVAL, FLUZONE)            Subjective:      Patient ID: Miasel Rivera is a 39 y o  male  He presents for follow up of EKG  According to patient he was required to check EKG prior to starting Elavil by neurology  EKG showed anterior fascicular block which was likely computer error  Per chart review this had already been discussed by PCP and patient was cleared to start taking Elavil for headache prevention following traumatic cervical spine injury but was not made aware  He would like his flu shot today  Current smoker and declines smoking cessation resources/counseling  The following portions of the patient's history were reviewed and updated as appropriate:   He  has a past medical history of Asthma    He   Patient Active Problem List    Diagnosis Date Noted    Abnormal finding on EKG 09/27/2020    At risk for long QT syndrome 09/25/2020    Encounter for medication review 09/12/2020    Encounter for immunization 09/12/2020    Traumatic closed fracture of C6 vertebra with minimal displacement ([facility] [address] ) 09/02/2019     He  has a past surgical history that includes Cholecystectomy and Gallbladder surgery (N/A, 2010)  His family history includes No Known Problems in his father and mother  He  reports that he has been smoking  He has never used smokeless tobacco  He reports previous alcohol use  He reports that he does not use drugs  Current Outpatient Medications   Medication Sig Dispense Refill    acetaminophen (TYLENOL) 325 mg tablet Take 3 tablets (975 mg total) by mouth every 8 (eight) hours (Patient taking differently: Take 975 mg by mouth every 8 (eight) hours as needed ) 30 tablet 0    amitriptyline (ELAVIL) 10 mg tablet start 10mg at bedtime  Increase by 10mg each week until good effect on headaches/pain or reach 50mg daily 150 tablet 3    methocarbamol (ROBAXIN) 750 mg tablet Take 1 tablet (750 mg total) by mouth 2 (two) times a day as needed for muscle spasms 60 tablet 0    multivitamin (THERAGRAN) TABS Take 1 tablet by mouth daily      naproxen sodium (ANAPROX) 550 mg tablet Take 550 mg by mouth 2 (two) times a day with meals      prochlorperazine (COMPAZINE) 10 mg tablet Take 1 tablet (10 mg total) by mouth every 6 (six) hours as needed for nausea or vomiting 12 tablet 3     No current facility-administered medications for this visit  He has No Known Allergies       Review of Systems   Constitutional: Negative for chills, fatigue and fever  HENT: Negative for congestion, sneezing and sore throat  Eyes: Negative for visual disturbance  Respiratory: Negative for cough and shortness of breath  Cardiovascular: Negative for chest pain, palpitations and leg swelling     Gastrointestinal: Negative for abdominal pain    Genitourinary: Negative for difficulty urinating  Musculoskeletal: Positive for neck pain  Negative for back pain  Neurological: Positive for headaches  Negative for dizziness, weakness and light-headedness  Psychiatric/Behavioral: Negative for sleep disturbance  Objective:      /80 (BP Location: Left arm, Patient Position: Sitting, Cuff Size: Large)   Pulse 88   Temp (!) 97 4 °F (36 3 °C) (Tympanic)   Resp 18   Ht 6' (1 829 m)   Wt 97 5 kg (215 lb)   BMI 29 16 kg/m²          Physical Exam  Vitals signs reviewed  Constitutional:       Appearance: Normal appearance  HENT:      Head: Normocephalic  Eyes:      Extraocular Movements: Extraocular movements intact  Conjunctiva/sclera: Conjunctivae normal    Cardiovascular:      Rate and Rhythm: Normal rate and regular rhythm  Pulses: Normal pulses  Heart sounds: Normal heart sounds  Pulmonary:      Effort: Pulmonary effort is normal       Breath sounds: Normal breath sounds  Abdominal:      General: Abdomen is flat  Bowel sounds are normal       Palpations: Abdomen is soft  Musculoskeletal: Normal range of motion  Skin:     General: Skin is warm and dry  Capillary Refill: Capillary refill takes less than 2 seconds  Neurological:      Mental Status: He is alert and oriented to person, place, and time     Psychiatric:         Mood and Affect: Mood normal          Behavior: Behavior normal

## 2020-09-11 ENCOUNTER — OFFICE VISIT (OUTPATIENT)
Dept: FAMILY MEDICINE CLINIC | Facility: CLINIC | Age: 45
End: 2020-09-11

## 2020-09-11 VITALS
DIASTOLIC BLOOD PRESSURE: 80 MMHG | BODY MASS INDEX: 29.12 KG/M2 | SYSTOLIC BLOOD PRESSURE: 128 MMHG | RESPIRATION RATE: 18 BRPM | HEART RATE: 88 BPM | TEMPERATURE: 97.4 F | WEIGHT: 215 LBS | HEIGHT: 72 IN

## 2020-09-11 DIAGNOSIS — R94.31 ABNORMAL FINDING ON EKG: Primary | ICD-10-CM

## 2020-09-11 DIAGNOSIS — Z11.4 SCREENING FOR HIV (HUMAN IMMUNODEFICIENCY VIRUS): ICD-10-CM

## 2020-09-11 DIAGNOSIS — Z23 ENCOUNTER FOR IMMUNIZATION: ICD-10-CM

## 2020-09-11 PROCEDURE — 3725F SCREEN DEPRESSION PERFORMED: CPT | Performed by: FAMILY MEDICINE

## 2020-09-11 PROCEDURE — 90686 IIV4 VACC NO PRSV 0.5 ML IM: CPT | Performed by: FAMILY MEDICINE

## 2020-09-11 PROCEDURE — 90471 IMMUNIZATION ADMIN: CPT | Performed by: FAMILY MEDICINE

## 2020-09-11 PROCEDURE — 99213 OFFICE O/P EST LOW 20 MIN: CPT | Performed by: FAMILY MEDICINE

## 2020-09-12 PROBLEM — Z23 ENCOUNTER FOR IMMUNIZATION: Status: ACTIVE | Noted: 2020-09-12

## 2020-09-12 PROBLEM — Z79.899 ENCOUNTER FOR MEDICATION REVIEW: Status: ACTIVE | Noted: 2020-09-12

## 2020-09-13 NOTE — ASSESSMENT & PLAN NOTE
Patient recommended to have EKG prior to initiation of Elavil for headache prevention following traumatic cervical spine injury - EKG performed and chart review revealed anterior fascicular block was likely computer error and patient ok to start Elavil as EKG was only to determine if he had any underlying QTc prolongation  Notified patient he is ok to begin Elavil

## 2020-09-22 ENCOUNTER — APPOINTMENT (OUTPATIENT)
Dept: URGENT CARE | Facility: MEDICAL CENTER | Age: 45
End: 2020-09-22
Payer: OTHER MISCELLANEOUS

## 2020-09-22 PROCEDURE — 99213 OFFICE O/P EST LOW 20 MIN: CPT | Performed by: PREVENTIVE MEDICINE

## 2020-09-25 PROBLEM — Z91.89 AT RISK FOR LONG QT SYNDROME: Status: ACTIVE | Noted: 2020-09-25

## 2020-09-27 PROBLEM — R94.31 ABNORMAL FINDING ON EKG: Status: ACTIVE | Noted: 2020-09-27

## 2020-09-28 NOTE — ASSESSMENT & PLAN NOTE
EKG was read as anterior fascicular block but on review was found to have normal QT interval  OK to start Amitriptyline with follow up EKG in 3 months

## 2020-10-09 ENCOUNTER — OFFICE VISIT (OUTPATIENT)
Dept: FAMILY MEDICINE CLINIC | Facility: CLINIC | Age: 45
End: 2020-10-09

## 2020-10-09 VITALS
HEIGHT: 72 IN | TEMPERATURE: 97.2 F | WEIGHT: 210.6 LBS | SYSTOLIC BLOOD PRESSURE: 138 MMHG | RESPIRATION RATE: 16 BRPM | HEART RATE: 87 BPM | DIASTOLIC BLOOD PRESSURE: 88 MMHG | BODY MASS INDEX: 28.53 KG/M2

## 2020-10-09 DIAGNOSIS — G43.009 MIGRAINE WITHOUT AURA AND WITHOUT STATUS MIGRAINOSUS, NOT INTRACTABLE: ICD-10-CM

## 2020-10-09 DIAGNOSIS — Z00.00 ANNUAL PHYSICAL EXAM: Primary | ICD-10-CM

## 2020-10-09 DIAGNOSIS — Z72.0 TOBACCO ABUSE: ICD-10-CM

## 2020-10-09 DIAGNOSIS — F41.9 ANXIETY: ICD-10-CM

## 2020-10-09 DIAGNOSIS — Z13.6 SCREENING FOR CARDIOVASCULAR CONDITION: ICD-10-CM

## 2020-10-09 DIAGNOSIS — Z13.1 SCREENING FOR DIABETES MELLITUS: ICD-10-CM

## 2020-10-09 DIAGNOSIS — Z11.4 SCREENING FOR HIV (HUMAN IMMUNODEFICIENCY VIRUS): ICD-10-CM

## 2020-10-09 DIAGNOSIS — Z13.220 SCREENING FOR LIPID DISORDERS: ICD-10-CM

## 2020-10-09 DIAGNOSIS — Z13.29 SCREENING FOR THYROID DISORDER: ICD-10-CM

## 2020-10-09 PROBLEM — E78.5 HYPERLIPIDEMIA: Status: ACTIVE | Noted: 2020-10-09

## 2020-10-09 PROCEDURE — 99396 PREV VISIT EST AGE 40-64: CPT | Performed by: FAMILY MEDICINE

## 2020-10-09 PROCEDURE — 4004F PT TOBACCO SCREEN RCVD TLK: CPT | Performed by: FAMILY MEDICINE

## 2020-10-14 ENCOUNTER — TELEPHONE (OUTPATIENT)
Dept: FAMILY MEDICINE CLINIC | Facility: CLINIC | Age: 45
End: 2020-10-14

## 2020-10-14 DIAGNOSIS — G44.321 INTRACTABLE CHRONIC POST-TRAUMATIC HEADACHE: Primary | ICD-10-CM

## 2020-10-15 ENCOUNTER — OFFICE VISIT (OUTPATIENT)
Dept: NEUROLOGY | Facility: CLINIC | Age: 45
End: 2020-10-15
Payer: OTHER MISCELLANEOUS

## 2020-10-15 VITALS
DIASTOLIC BLOOD PRESSURE: 80 MMHG | HEART RATE: 91 BPM | TEMPERATURE: 97.9 F | SYSTOLIC BLOOD PRESSURE: 116 MMHG | HEIGHT: 72 IN | WEIGHT: 212 LBS | BODY MASS INDEX: 28.71 KG/M2

## 2020-10-15 DIAGNOSIS — G44.86 CERVICOGENIC HEADACHE: ICD-10-CM

## 2020-10-15 DIAGNOSIS — G44.329 CHRONIC POST-TRAUMATIC HEADACHE, NOT INTRACTABLE: Primary | ICD-10-CM

## 2020-10-15 PROCEDURE — 3008F BODY MASS INDEX DOCD: CPT | Performed by: FAMILY MEDICINE

## 2020-10-15 PROCEDURE — 99214 OFFICE O/P EST MOD 30 MIN: CPT | Performed by: PSYCHIATRY & NEUROLOGY

## 2021-02-08 ENCOUNTER — TELEPHONE (OUTPATIENT)
Dept: NEUROLOGY | Facility: CLINIC | Age: 46
End: 2021-02-08

## 2021-02-08 NOTE — TELEPHONE ENCOUNTER
Called pt to confirm upcoming apt in 2 weeks on 2/22/21 with Dr Ed Valentine    Asked pt if any new/worsening symptoms to report? Nothing to report at this time    Asked pt if they are unable to keep apt or interested in virtual apt to please call office, also advised of no show fee  Advised we will call closer to day of apt for COVID screening

## 2021-06-03 ENCOUNTER — IMMUNIZATIONS (OUTPATIENT)
Dept: FAMILY MEDICINE CLINIC | Facility: HOSPITAL | Age: 46
End: 2021-06-03

## 2021-06-03 DIAGNOSIS — Z23 ENCOUNTER FOR IMMUNIZATION: Primary | ICD-10-CM

## 2021-06-03 PROCEDURE — 91300 SARS-COV-2 / COVID-19 MRNA VACCINE (PFIZER-BIONTECH) 30 MCG: CPT

## 2021-06-03 PROCEDURE — 0001A SARS-COV-2 / COVID-19 MRNA VACCINE (PFIZER-BIONTECH) 30 MCG: CPT

## 2021-07-07 ENCOUNTER — IMMUNIZATIONS (OUTPATIENT)
Dept: FAMILY MEDICINE CLINIC | Facility: HOSPITAL | Age: 46
End: 2021-07-07

## 2021-07-07 DIAGNOSIS — Z23 ENCOUNTER FOR IMMUNIZATION: Primary | ICD-10-CM

## 2021-07-07 PROCEDURE — 91300 SARS-COV-2 / COVID-19 MRNA VACCINE (PFIZER-BIONTECH) 30 MCG: CPT

## 2021-07-07 PROCEDURE — 0002A SARS-COV-2 / COVID-19 MRNA VACCINE (PFIZER-BIONTECH) 30 MCG: CPT

## 2021-10-15 ENCOUNTER — TELEPHONE (OUTPATIENT)
Dept: FAMILY MEDICINE CLINIC | Facility: CLINIC | Age: 46
End: 2021-10-15

## 2022-10-11 ENCOUNTER — VBI (OUTPATIENT)
Dept: ADMINISTRATIVE | Facility: OTHER | Age: 47
End: 2022-10-11

## 2023-10-04 ENCOUNTER — VBI (OUTPATIENT)
Dept: ADMINISTRATIVE | Facility: OTHER | Age: 48
End: 2023-10-04

## 2024-10-29 ENCOUNTER — VBI (OUTPATIENT)
Dept: ADMINISTRATIVE | Facility: OTHER | Age: 49
End: 2024-10-29

## 2024-10-29 NOTE — TELEPHONE ENCOUNTER
10/29/24 1:36 PM     Chart reviewed for CRC: Colonoscopy ; nothing is submitted to the patient's insurance at this time.     Ling Hobson MA   [facility]

## 2025-04-02 ENCOUNTER — OFFICE VISIT (OUTPATIENT)
Dept: FAMILY MEDICINE CLINIC | Facility: CLINIC | Age: 50
End: 2025-04-02

## 2025-04-02 VITALS
BODY MASS INDEX: 29.64 KG/M2 | OXYGEN SATURATION: 97 % | TEMPERATURE: 98.6 F | RESPIRATION RATE: 18 BRPM | SYSTOLIC BLOOD PRESSURE: 112 MMHG | HEART RATE: 93 BPM | DIASTOLIC BLOOD PRESSURE: 86 MMHG | WEIGHT: 218.8 LBS | HEIGHT: 72 IN

## 2025-04-02 DIAGNOSIS — Z11.4 SCREENING FOR HIV (HUMAN IMMUNODEFICIENCY VIRUS): ICD-10-CM

## 2025-04-02 DIAGNOSIS — G43.009 MIGRAINE WITHOUT AURA AND WITHOUT STATUS MIGRAINOSUS, NOT INTRACTABLE: ICD-10-CM

## 2025-04-02 DIAGNOSIS — Z13.228 SCREENING FOR METABOLIC DISORDER: ICD-10-CM

## 2025-04-02 DIAGNOSIS — Z13.220 SCREENING FOR CHOLESTEROL LEVEL: ICD-10-CM

## 2025-04-02 DIAGNOSIS — Z11.59 NEED FOR HEPATITIS C SCREENING TEST: ICD-10-CM

## 2025-04-02 DIAGNOSIS — Z00.00 ANNUAL PHYSICAL EXAM: Primary | ICD-10-CM

## 2025-04-02 DIAGNOSIS — Z91.89 AT RISK FOR LONG QT SYNDROME: ICD-10-CM

## 2025-04-02 PROCEDURE — 99386 PREV VISIT NEW AGE 40-64: CPT | Performed by: FAMILY MEDICINE

## 2025-04-02 RX ORDER — MAGNESIUM OXIDE 400 MG/1
400 TABLET ORAL DAILY PRN
Qty: 30 TABLET | Refills: 0 | Status: SHIPPED | OUTPATIENT
Start: 2025-04-02

## 2025-04-02 NOTE — ASSESSMENT & PLAN NOTE
-After neck injury in 2020 was having headaches on most days for 3+ years, states that now he is having headaches only 2-3 times a month  -Previously taking amitriptyline and Compazine but states that Nurtec has been the most beneficial medication he has taken (patient requests Nurtec refill)  -Patient denies concerning neurological symptoms such as weakness, numbness, radiculopathic pain, ROM of neck fully intact    Plan  -Nurtec 75 mg to take as needed for severe headache  -Patient was also amenable to mag-ox for potential headaches    Orders:    rimegepant sulfate (NURTEC) 75 mg TBDP; Take 1 tablet (75 mg total) by mouth daily as needed (headache)    magnesium oxide (MAG-OX) 400 mg tablet; Take 1 tablet (400 mg total) by mouth daily as needed (headaches)

## 2025-04-02 NOTE — ASSESSMENT & PLAN NOTE
-Previous EKG 7/2020 seen to have QTC of 440, left anterior fascicular block  -Did not follow up after EKG    Plan  -Obtain EKG again, potential referral to cardiology    Orders:    ECG 12 lead; Future

## 2025-04-02 NOTE — PROGRESS NOTES
Adult Annual Physical  Name: Ariel Hood      : 1975      MRN: [id number]  Encounter Provider: Delonte Saunders MD  Encounter Date: 2025   Encounter department: [facility] [place]    :  Assessment & Plan  Annual physical exam  -No acute concerns at today's visit, patient has not been seen at a clinic and about 5 years  -History of traumatic neck injury (C6 fracture) in , no residual neck pain or weakness and improvement in headaches  -Patient concerned about 2 skin lesions: xanthoma and what appears to be seborrheic keratosis on his chest  -Patient states that he has had Tdap within past 10 years  -Declines colonoscopy/cologuard until after 52 y.o.  -Amenable to hepatitis C, HIV screening    Plan  -F/u annual wellness visit in one year       At risk for long QT syndrome  -Previous EKG 2020 seen to have QTC of 440, left anterior fascicular block  -Did not follow up after EKG    Plan  -Obtain EKG again, potential referral to cardiology    Orders:    ECG 12 lead; Future    Migraine without aura and without status migrainosus, not intractable  -After neck injury in 2020 was having headaches on most days for 3+ years, states that now he is having headaches only 2-3 times a month  -Previously taking amitriptyline and Compazine but states that Nurtec has been the most beneficial medication he has taken (patient requests Nurtec refill)  -Patient denies concerning neurological symptoms such as weakness, numbness, radiculopathic pain, ROM of neck fully intact    Plan  -Nurtec 75 mg to take as needed for severe headache  -Patient was also amenable to mag-ox for potential headaches    Orders:    rimegepant sulfate (NURTEC) 75 mg TBDP; Take 1 tablet (75 mg total) by mouth daily as needed (headache)    magnesium oxide (MAG-OX) 400 mg tablet; Take 1 tablet (400 mg total) by mouth daily as needed (headaches)    Screening for metabolic disorder  -Patient amenable to  screening for electrolytes, kidney function, fasting glucose    Orders:    Basic metabolic panel; Future    Screening for cholesterol level  -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  -Most recent record of lipid panel 4/6/2017: Total cholesterol 248, , HDL 49, triglycerides 107    Plan  -Given hyperlipidemia 8 years ago as well as presence of xanthoma will screen for cholesterol  -Plans to begin statin if cholesterol is significantly elevated, patient appears amenable    Orders:    Lipid panel; Future    Screening for HIV (human immunodeficiency virus)  -Patient amenable to HIV screening    Orders:    HIV 1/2 AB/AG w Reflex [facility] for 2 yr old and above; Future    Need for hepatitis C screening test  -Patient amenable to hepatitis C screening    Orders:    Hepatitis C antibody; Future        Preventive Screenings:  - Diabetes Screening: orders placed  - Cholesterol Screening: has hyperlipidemia and orders placed   - Hepatitis C screening: patient agrees to screening and orders placed   - HIV screening: patient agrees to screening and orders placed   - Colon cancer screening: patient declines   - Lung cancer screening: screening not indicated   - Prostate cancer screening: screening not indicated     Immunizations:  - Immunizations due: Influenza, Prevnar 20 and Tdap    Counseling/Anticipatory Guidance:    - Tobacco use: discussed harms of tobacco use and management options for quitting.   - Dental health: discussed importance of regular tooth brushing, flossing, and dental visits.   - Diet: discussed recommendations for a healthy/well-balanced diet.          History of Present Illness     Adult Annual Physical:  Patient presents for annual physical. 49-year-old male presents for first appointment in about 5 years.  He has no significant concerns today, but wanted to have 2 spots on his skin looked at as well as updated labs, an EKG, and refill for Nurtec.  He has a notable history of a significant neck injury resulting  in C6 fracture which severely limited his activity for a few years.  He was having chronic headaches during this time but has significantly improved over the past year.  He states that now he is only having 2-3 headaches a year.  He requests refill of Nurtec as it was beneficial for him when he was having headache.  He showed a xanthoma of his left lower eyelid, which I explained was likely due to high cholesterol.  He also had a small support keratitis on his chest that he wanted to have looked out as well.  He also in 2020 he had an EKG which showed findings concerning for prolonged QTc and potential left anterior fascicular block.  He states that after his injury he had a lot of anxiety that he was going to get injured again, but states that his anxiety has significantly subsided as his neck is not limiting him as much.  He states that he occasionally is having some neck pain but denies any residual weakness/numbness.  He is attempting to lose some weight and is trying to improve his diet, he states he has lost about 15 pounds in the past year since becoming more active.  Currently he is smoking about 10 cigarettes a day, feels that he is down from around a full pack of cigarettes a day that he was smoking for the past 5 years.  He denies any alcohol or drug use.    He had 2 root canals recently on his second molars of his mandible, he is planning to have crowns placed on these teeth.  His dentist requested us complete paperwork prior to this appointment, but it appears that is related to insurance approval for a medical condition which does not seem likely to be granted.  .     Diet and Physical Activity:  - Diet/Nutrition: no special diet. meat, chicken, potatoes, vegetables occasionally  - Exercise: no formal exercise and walking. working on renovating houses    Depression Screening:  - PHQ-2 Score: 0    General Health:  - Sleep: > 8 hours of sleep on average.  - Hearing: normal hearing bilateral ears.  -  Vision: no vision problems.  - Dental: regular dental visits. needs to have crowns put on two teeth, previously had root canal    /GYN Health:  - Follows with GYN: no.   - History of STDs: no     Health:  - History of STDs: no.   - Urinary symptoms: none.     Advanced Care Planning:  - Has an advanced directive?: no    - Has a durable medical POA?: no      Review of Systems   Constitutional:  Negative for fatigue.   HENT:  Positive for dental problem.    Eyes:  Negative for visual disturbance.        Xanthoma left inferior lid   Respiratory:  Negative for cough, shortness of breath and wheezing.    Cardiovascular:  Negative for chest pain and palpitations.   Musculoskeletal:  Positive for back pain and neck pain.   Skin:         Xanthoma, seborrhic keratosis    Neurological:  Positive for headaches. Negative for dizziness, weakness and numbness.   Psychiatric/Behavioral:  Negative for sleep disturbance. The patient is not nervous/anxious.          Objective   /86 (BP Location: Left arm, Patient Position: Sitting, Cuff Size: Large)   Pulse 93   Temp 98.6 °F (37 °C) (Temporal)   Resp 18   Ht 6' (1.829 m)   Wt 99.2 kg (218 lb 12.8 oz)   SpO2 97%   BMI 29.67 kg/m²     Physical Exam  Constitutional:       General: He is not in acute distress.     Appearance: Normal appearance.   HENT:      Head: Normocephalic and atraumatic.      Right Ear: Tympanic membrane normal.      Left Ear: Tympanic membrane normal.      Mouth/Throat:      Mouth: Mucous membranes are dry.      Comments: Second mandibular molars bilaterally s/p root canal  Eyes:      Extraocular Movements: Extraocular movements intact.      Pupils: Pupils are equal, round, and reactive to light.   Cardiovascular:      Rate and Rhythm: Normal rate and regular rhythm.      Heart sounds: Normal heart sounds. No murmur heard.  Pulmonary:      Effort: Pulmonary effort is normal. No respiratory distress.      Breath sounds: Normal breath sounds. No  wheezing.   Abdominal:      General: Abdomen is flat. Bowel sounds are normal. There is no distension.      Tenderness: There is no abdominal tenderness.   Musculoskeletal:         General: Normal range of motion.      Cervical back: Normal range of motion. No rigidity or tenderness.   Skin:     Findings: Lesion present.      Comments: Xanthoma right lower eyelid near medial canthus, small ~1 cm seborrheic keratosis on central portion of chest   Neurological:      General: No focal deficit present.      Mental Status: He is alert and oriented to person, place, and time.      Cranial Nerves: No cranial nerve deficit.      Sensory: No sensory deficit.      Motor: No weakness.      Comments: No weakness or ROM limitation in lower or upper extremities   Psychiatric:         Mood and Affect: Mood normal.         Behavior: Behavior normal.

## 2025-04-02 NOTE — PATIENT INSTRUCTIONS
"Patient Education     Routine physical for adults   The Basics   Written by the doctors and editors at [facility]   What is a physical? -- A physical is a routine visit, or \"check-up,\" with your doctor. You might also hear it called a \"wellness visit\" or \"preventive visit.\"  During each visit, the doctor will:   Ask about your physical and mental health   Ask about your habits, behaviors, and lifestyle   Do an exam   Give you vaccines if needed   Talk to you about any medicines you take   Give advice about your health   Answer your questions  Getting regular check-ups is an important part of taking care of your health. It can help your doctor find and treat any problems you have. But it's also important for preventing health problems.  A routine physical is different from a \"sick visit.\" A sick visit is when you see a doctor because of a health concern or problem. Since physicals are scheduled ahead of time, you can think about what you want to ask the doctor.  How often should I get a physical? -- It depends on your age and health. In general, for people age 21 years and older:   If you are younger than 50 years, you might be able to get a physical every 3 years.   If you are 50 years or older, your doctor might recommend a physical every year.  If you have an ongoing health condition, like diabetes or high blood pressure, your doctor will probably want to see you more often.  What happens during a physical? -- In general, each visit will include:   Physical exam - The doctor or nurse will check your height, weight, heart rate, and blood pressure. They will also look at your eyes and ears. They will ask about how you are feeling and whether you have any symptoms that bother you.   Medicines - It's a good idea to bring a list of all the medicines you take to each doctor visit. Your doctor will talk to you about your medicines and answer any questions. Tell them if you are having any side effects that bother you. You " "should also tell them if you are having trouble paying for any of your medicines.   Habits and behaviors - This includes:   Your diet   Your exercise habits   Whether you smoke, drink alcohol, or use drugs   Whether you are sexually active   Whether you feel safe at home  Your doctor will talk to you about things you can do to improve your health and lower your risk of health problems. They will also offer help and support. For example, if you want to quit smoking, they can give you advice and might prescribe medicines. If you want to improve your diet or get more physical activity, they can help you with this, too.   Lab tests, if needed - The tests you get will depend on your age and situation. For example, your doctor might want to check your:   Cholesterol   Blood sugar   Iron level   Vaccines - The recommended vaccines will depend on your age, health, and what vaccines you already had. Vaccines are very important because they can prevent certain serious or deadly infections.   Discussion of screening - \"Screening\" means checking for diseases or other health problems before they cause symptoms. Your doctor can recommend screening based on your age, risk, and preferences. This might include tests to check for:   Cancer, such as breast, prostate, cervical, ovarian, colorectal, prostate, lung, or skin cancer   Sexually transmitted infections, such as chlamydia and gonorrhea   Mental health conditions like depression and anxiety  Your doctor will talk to you about the different types of screening tests. They can help you decide which screenings to have. They can also explain what the results might mean.   Answering questions - The physical is a good time to ask the doctor or nurse questions about your health. If needed, they can refer you to other doctors or specialists, too.  Adults older than 65 years often need other care, too. As you get older, your doctor will talk to you about:   How to prevent falling at " home   Hearing or vision tests   Memory testing   How to take your medicines safely   Making sure that you have the help and support you need at home  All topics are updated as new evidence becomes available and our peer review process is complete.  This topic retrieved from [employer] on: May 02, 2024.  Topic 748129 Version 1.0  Release: 32.4.3 - C32.122  © 2024 UpToDate, Inc. and/or its affiliates. All rights reserved.  Consumer Information Use and Disclaimer   Disclaimer: This generalized information is a limited summary of diagnosis, treatment, and/or medication information. It is not meant to be comprehensive and should be used as a tool to help the user understand and/or assess potential diagnostic and treatment options. It does NOT include all information about conditions, treatments, medications, side effects, or risks that may apply to a specific patient. It is not intended to be medical advice or a substitute for the medical advice, diagnosis, or treatment of a health care provider based on the health care provider's examination and assessment of a patient's specific and unique circumstances. Patients must speak with a health care provider for complete information about their health, medical questions, and treatment options, including any risks or benefits regarding use of medications. This information does not endorse any treatments or medications as safe, effective, or approved for treating a specific patient. UpToDate, Inc. and its affiliates disclaim any warranty or liability relating to this information or the use thereof.The use of this information is governed by the Terms of Use, available at https://www.woltersMedNewsuwer.com/en/know/clinical-effectiveness-terms. 2024© UpToDate, Inc. and its affiliates and/or licensors. All rights reserved.  Copyright   © 2024 UpToDate, Inc. and/or its affiliates. All rights reserved.    Patient Education     Routine physical for adults   The Basics   Written by the  "doctors and editors [facility]   What is a physical? -- A physical is a routine visit, or \"check-up,\" with your doctor. You might also hear it called a \"wellness visit\" or \"preventive visit.\"  During each visit, the doctor will:   Ask about your physical and mental health   Ask about your habits, behaviors, and lifestyle   Do an exam   Give you vaccines if needed   Talk to you about any medicines you take   Give advice about your health   Answer your questions  Getting regular check-ups is an important part of taking care of your health. It can help your doctor find and treat any problems you have. But it's also important for preventing health problems.  A routine physical is different from a \"sick visit.\" A sick visit is when you see a doctor because of a health concern or problem. Since physicals are scheduled ahead of time, you can think about what you want to ask the doctor.  How often should I get a physical? -- It depends on your age and health. In general, for people age 21 years and older:   If you are younger than 50 years, you might be able to get a physical every 3 years.   If you are 50 years or older, your doctor might recommend a physical every year.  If you have an ongoing health condition, like diabetes or high blood pressure, your doctor will probably want to see you more often.  What happens during a physical? -- In general, each visit will include:   Physical exam - The doctor or nurse will check your height, weight, heart rate, and blood pressure. They will also look at your eyes and ears. They will ask about how you are feeling and whether you have any symptoms that bother you.   Medicines - It's a good idea to bring a list of all the medicines you take to each doctor visit. Your doctor will talk to you about your medicines and answer any questions. Tell them if you are having any side effects that bother you. You should also tell them if you are having trouble paying for any of your " "medicines.   Habits and behaviors - This includes:   Your diet   Your exercise habits   Whether you smoke, drink alcohol, or use drugs   Whether you are sexually active   Whether you feel safe at home  Your doctor will talk to you about things you can do to improve your health and lower your risk of health problems. They will also offer help and support. For example, if you want to quit smoking, they can give you advice and might prescribe medicines. If you want to improve your diet or get more physical activity, they can help you with this, too.   Lab tests, if needed - The tests you get will depend on your age and situation. For example, your doctor might want to check your:   Cholesterol   Blood sugar   Iron level   Vaccines - The recommended vaccines will depend on your age, health, and what vaccines you already had. Vaccines are very important because they can prevent certain serious or deadly infections.   Discussion of screening - \"Screening\" means checking for diseases or other health problems before they cause symptoms. Your doctor can recommend screening based on your age, risk, and preferences. This might include tests to check for:   Cancer, such as breast, prostate, cervical, ovarian, colorectal, prostate, lung, or skin cancer   Sexually transmitted infections, such as chlamydia and gonorrhea   Mental health conditions like depression and anxiety  Your doctor will talk to you about the different types of screening tests. They can help you decide which screenings to have. They can also explain what the results might mean.   Answering questions - The physical is a good time to ask the doctor or nurse questions about your health. If needed, they can refer you to other doctors or specialists, too.  Adults older than 65 years often need other care, too. As you get older, your doctor will talk to you about:   How to prevent falling at home   Hearing or vision tests   Memory testing   How to take your " medicines safely   Making sure that you have the help and support you need at home  All topics are updated as new evidence becomes available and our peer review process is complete.  This topic retrieved from [employer] on: May 02, 2024.  Topic 282780 Version 1.0  Release: 32.4.3 - C32.122  © 2024 UpToDate, Inc. and/or its affiliates. All rights reserved.  Consumer Information Use and Disclaimer   Disclaimer: This generalized information is a limited summary of diagnosis, treatment, and/or medication information. It is not meant to be comprehensive and should be used as a tool to help the user understand and/or assess potential diagnostic and treatment options. It does NOT include all information about conditions, treatments, medications, side effects, or risks that may apply to a specific patient. It is not intended to be medical advice or a substitute for the medical advice, diagnosis, or treatment of a health care provider based on the health care provider's examination and assessment of a patient's specific and unique circumstances. Patients must speak with a health care provider for complete information about their health, medical questions, and treatment options, including any risks or benefits regarding use of medications. This information does not endorse any treatments or medications as safe, effective, or approved for treating a specific patient. UpToDate, Inc. and its affiliates disclaim any warranty or liability relating to this information or the use thereof.The use of this information is governed by the Terms of Use, available at https://www.woltersGoWorkaBituwer.com/en/know/clinical-effectiveness-terms. 2024© UpToDate, Inc. and its affiliates and/or licensors. All rights reserved.  Copyright   © 2024 UpToDate, Inc. and/or its affiliates. All rights reserved.

## 2025-04-03 ENCOUNTER — TELEPHONE (OUTPATIENT)
Dept: FAMILY MEDICINE CLINIC | Facility: CLINIC | Age: 50
End: 2025-04-03

## 2025-04-03 NOTE — TELEPHONE ENCOUNTER
PA for NURTEC 75 mg Submitted to ([facility]) via:    [x]EPIC-Case ID # [id number]       Office notes sent, clinical questions answered. Awaiting determination    Turnaround time for your insurance to make a decision on your Prior Authorization can take 3-5 business day.

## 2025-04-04 DIAGNOSIS — G43.009 MIGRAINE WITHOUT AURA AND WITHOUT STATUS MIGRAINOSUS, NOT INTRACTABLE: Primary | ICD-10-CM

## 2025-04-04 RX ORDER — SUMATRIPTAN SUCCINATE 100 MG/1
100 TABLET ORAL AS NEEDED
Qty: 10 TABLET | Refills: 1 | Status: SHIPPED | OUTPATIENT
Start: 2025-04-04

## 2025-04-04 NOTE — PROGRESS NOTES
Patient was seen 4/2. He had previously taken Nurtec samples provided to him by neurology in 2020 which he states were helpful for frequent headaches/migraines and requested prescription for Nurtec. The frequency of his headaches have significantly improved in recent years. This request for Nurtec was not approved by insurance and prior auth was submitted. While waiting for potential approval, will start sumatriptan in the meantime.    Contacted patient and he was okay with trying sumatriptan to use as needed. Instructed patient to take one 100 mg sumatriptan and an additional one after 2 hrs, maximum of 200 mg daily.

## 2025-05-12 DIAGNOSIS — G43.009 MIGRAINE WITHOUT AURA AND WITHOUT STATUS MIGRAINOSUS, NOT INTRACTABLE: ICD-10-CM

## 2025-05-12 RX ORDER — MAGNESIUM OXIDE 400 MG/1
1 TABLET ORAL DAILY PRN
Qty: 30 TABLET | Refills: 2 | Status: SHIPPED | OUTPATIENT
Start: 2025-05-12

## 2025-05-20 ENCOUNTER — VBI (OUTPATIENT)
Dept: ADMINISTRATIVE | Facility: OTHER | Age: 50
End: 2025-05-20

## 2025-05-20 NOTE — TELEPHONE ENCOUNTER
05/20/25 1:09 PM     Chart reviewed for CRC: Colonoscopy ; nothing is submitted to the patient's insurance at this time.     Дмитрий Whitaker MA   [facility]

## 2025-06-04 ENCOUNTER — TELEPHONE (OUTPATIENT)
Dept: FAMILY MEDICINE CLINIC | Facility: CLINIC | Age: 50
End: 2025-06-04

## 2025-06-04 NOTE — TELEPHONE ENCOUNTER
The following blood work order (s) for this patient is overdue and were expected to be done on 4/2/25.     Blood work ordered and date:  HIV 1/2 AB/AG w Reflex [facility] for 2 yr old and above 4/2/25  Hepatitis C antibody 4/2/25    Attending supervising the visit related with this order (s) is Dr. Daniels on 4/2/25 with clinician Dr. Saunders.     Last visit on 4/2/25.  Next visit No scheduled.    Attending, please review this order (s) if needs to be canceled or patient needs to be called as a reminder. Thank you.

## 2025-06-13 ENCOUNTER — TELEPHONE (OUTPATIENT)
Dept: FAMILY MEDICINE CLINIC | Facility: CLINIC | Age: 50
End: 2025-06-13

## 2025-07-28 ENCOUNTER — OFFICE VISIT (OUTPATIENT)
Dept: LAB | Facility: CLINIC | Age: 50
End: 2025-07-28
Payer: COMMERCIAL

## 2025-07-28 ENCOUNTER — APPOINTMENT (OUTPATIENT)
Dept: LAB | Facility: CLINIC | Age: 50
End: 2025-07-28
Payer: COMMERCIAL

## 2025-07-28 DIAGNOSIS — Z91.89 AT RISK FOR LONG QT SYNDROME: ICD-10-CM

## 2025-07-28 PROCEDURE — 93005 ELECTROCARDIOGRAM TRACING: CPT

## 2025-07-29 LAB
ATRIAL RATE: 86 BPM
P AXIS: 67 DEGREES
PR INTERVAL: 160 MS
QRS AXIS: -34 DEGREES
QRSD INTERVAL: 94 MS
QT INTERVAL: 376 MS
QTC INTERVAL: 450 MS
T WAVE AXIS: 59 DEGREES
VENTRICULAR RATE: 86 BPM

## 2025-07-29 PROCEDURE — 93010 ELECTROCARDIOGRAM REPORT: CPT | Performed by: STUDENT IN AN ORGANIZED HEALTH CARE EDUCATION/TRAINING PROGRAM

## 2025-07-30 ENCOUNTER — APPOINTMENT (OUTPATIENT)
Dept: LAB | Facility: CLINIC | Age: 50
End: 2025-07-30
Payer: COMMERCIAL

## 2025-07-30 DIAGNOSIS — Z13.220 SCREENING FOR CHOLESTEROL LEVEL: ICD-10-CM

## 2025-07-30 DIAGNOSIS — Z11.59 NEED FOR HEPATITIS C SCREENING TEST: ICD-10-CM

## 2025-07-30 DIAGNOSIS — Z11.4 SCREENING FOR HIV (HUMAN IMMUNODEFICIENCY VIRUS): ICD-10-CM

## 2025-07-30 DIAGNOSIS — Z13.228 SCREENING FOR METABOLIC DISORDER: ICD-10-CM

## 2025-07-30 LAB
ANION GAP SERPL CALCULATED.3IONS-SCNC: 6 MMOL/L (ref 4–13)
BUN SERPL-MCNC: 10 MG/DL (ref 5–25)
CALCIUM SERPL-MCNC: 9.3 MG/DL (ref 8.4–10.2)
CHLORIDE SERPL-SCNC: 105 MMOL/L (ref 96–108)
CHOLEST SERPL-MCNC: 249 MG/DL (ref ?–200)
CO2 SERPL-SCNC: 26 MMOL/L (ref 21–32)
CREAT SERPL-MCNC: 0.98 MG/DL (ref 0.6–1.3)
GFR SERPL CREATININE-BSD FRML MDRD: 90 ML/MIN/1.73SQ M
GLUCOSE P FAST SERPL-MCNC: 95 MG/DL (ref 65–99)
HDLC SERPL-MCNC: 41 MG/DL
LDLC SERPL CALC-MCNC: 185 MG/DL (ref 0–100)
NONHDLC SERPL-MCNC: 208 MG/DL
POTASSIUM SERPL-SCNC: 4.2 MMOL/L (ref 3.5–5.3)
SODIUM SERPL-SCNC: 137 MMOL/L (ref 135–147)
TRIGL SERPL-MCNC: 115 MG/DL (ref ?–150)

## 2025-07-30 PROCEDURE — 86803 HEPATITIS C AB TEST: CPT

## 2025-07-30 PROCEDURE — 87389 HIV-1 AG W/HIV-1&-2 AB AG IA: CPT

## 2025-07-30 PROCEDURE — 36415 COLL VENOUS BLD VENIPUNCTURE: CPT

## 2025-07-30 PROCEDURE — 80048 BASIC METABOLIC PNL TOTAL CA: CPT

## 2025-07-30 PROCEDURE — 80061 LIPID PANEL: CPT

## 2025-07-31 LAB
HCV AB SER QL: NORMAL
HIV 1+2 AB+HIV1 P24 AG SERPL QL IA: NORMAL

## 2025-08-05 ENCOUNTER — OFFICE VISIT (OUTPATIENT)
Dept: FAMILY MEDICINE CLINIC | Facility: CLINIC | Age: 50
End: 2025-08-05

## 2025-08-05 VITALS
OXYGEN SATURATION: 93 % | TEMPERATURE: 98.5 F | BODY MASS INDEX: 29.31 KG/M2 | WEIGHT: 216.4 LBS | SYSTOLIC BLOOD PRESSURE: 111 MMHG | RESPIRATION RATE: 18 BRPM | HEART RATE: 98 BPM | DIASTOLIC BLOOD PRESSURE: 75 MMHG | HEIGHT: 72 IN

## 2025-08-05 DIAGNOSIS — E78.2 MIXED HYPERLIPIDEMIA: Primary | ICD-10-CM

## 2025-08-05 DIAGNOSIS — Z87.891 SMOKING HISTORY: ICD-10-CM

## 2025-08-05 DIAGNOSIS — R05.3 CHRONIC COUGH: ICD-10-CM

## 2025-08-05 DIAGNOSIS — B35.1 ONYCHOMYCOSIS: ICD-10-CM

## 2025-08-05 DIAGNOSIS — G43.009 MIGRAINE WITHOUT AURA AND WITHOUT STATUS MIGRAINOSUS, NOT INTRACTABLE: ICD-10-CM

## 2025-08-05 DIAGNOSIS — R06.83 SNORING: ICD-10-CM

## 2025-08-05 DIAGNOSIS — R94.31 PROLONGED Q-T INTERVAL ON ECG: ICD-10-CM

## 2025-08-05 PROCEDURE — 99213 OFFICE O/P EST LOW 20 MIN: CPT | Performed by: FAMILY MEDICINE

## 2025-08-05 RX ORDER — BENZONATATE 100 MG/1
100 CAPSULE ORAL 3 TIMES DAILY PRN
Qty: 60 CAPSULE | Refills: 1 | Status: SHIPPED | OUTPATIENT
Start: 2025-08-05

## 2025-08-05 RX ORDER — MAGNESIUM OXIDE 400 MG/1
1 TABLET ORAL DAILY PRN
Qty: 30 TABLET | Refills: 2 | Status: SHIPPED | OUTPATIENT
Start: 2025-08-05

## 2025-08-05 RX ORDER — ATORVASTATIN CALCIUM 20 MG/1
20 TABLET, FILM COATED ORAL DAILY
Qty: 90 TABLET | Refills: 3 | Status: SHIPPED | OUTPATIENT
Start: 2025-08-05

## 2025-08-06 DIAGNOSIS — G43.009 MIGRAINE WITHOUT AURA AND WITHOUT STATUS MIGRAINOSUS, NOT INTRACTABLE: ICD-10-CM

## 2025-08-08 ENCOUNTER — TELEPHONE (OUTPATIENT)
Dept: FAMILY MEDICINE CLINIC | Facility: CLINIC | Age: 50
End: 2025-08-08

## 2025-08-08 DIAGNOSIS — G43.009 MIGRAINE WITHOUT AURA AND WITHOUT STATUS MIGRAINOSUS, NOT INTRACTABLE: Primary | ICD-10-CM

## 2025-08-12 ENCOUNTER — TELEPHONE (OUTPATIENT)
Dept: FAMILY MEDICINE CLINIC | Facility: CLINIC | Age: 50
End: 2025-08-12

## 2025-08-18 DIAGNOSIS — G43.009 MIGRAINE WITHOUT AURA AND WITHOUT STATUS MIGRAINOSUS, NOT INTRACTABLE: Primary | ICD-10-CM

## 2025-08-18 RX ORDER — RIZATRIPTAN BENZOATE 10 MG/1
10 TABLET ORAL AS NEEDED
Qty: 30 TABLET | Refills: 1 | Status: SHIPPED | OUTPATIENT
Start: 2025-08-18